# Patient Record
Sex: FEMALE | Race: ASIAN | NOT HISPANIC OR LATINO | Employment: FULL TIME
[De-identification: names, ages, dates, MRNs, and addresses within clinical notes are randomized per-mention and may not be internally consistent; named-entity substitution may affect disease eponyms.]

---

## 2017-01-16 ENCOUNTER — RECORDS - HEALTHEAST (OUTPATIENT)
Dept: ADMINISTRATIVE | Facility: OTHER | Age: 52
End: 2017-01-16

## 2017-01-27 ENCOUNTER — RECORDS - HEALTHEAST (OUTPATIENT)
Dept: ADMINISTRATIVE | Facility: OTHER | Age: 52
End: 2017-01-27

## 2017-03-13 ENCOUNTER — OFFICE VISIT - HEALTHEAST (OUTPATIENT)
Dept: INTERNAL MEDICINE | Facility: CLINIC | Age: 52
End: 2017-03-13

## 2017-03-13 DIAGNOSIS — M19.90 OSTEOARTHRITIS: ICD-10-CM

## 2017-03-13 DIAGNOSIS — M25.551 RIGHT HIP PAIN: ICD-10-CM

## 2017-03-13 ASSESSMENT — MIFFLIN-ST. JEOR: SCORE: 1075.35

## 2017-06-12 ENCOUNTER — RECORDS - HEALTHEAST (OUTPATIENT)
Dept: ADMINISTRATIVE | Facility: OTHER | Age: 52
End: 2017-06-12

## 2017-06-12 ENCOUNTER — AMBULATORY - HEALTHEAST (OUTPATIENT)
Dept: MULTI SPECIALTY CLINIC | Facility: CLINIC | Age: 52
End: 2017-06-12

## 2017-06-12 LAB — PAP SMEAR - HIM PATIENT REPORTED: NORMAL

## 2017-06-13 ENCOUNTER — OFFICE VISIT - HEALTHEAST (OUTPATIENT)
Dept: INTERNAL MEDICINE | Facility: CLINIC | Age: 52
End: 2017-06-13

## 2017-06-13 DIAGNOSIS — I10 ESSENTIAL HYPERTENSION: ICD-10-CM

## 2017-06-13 DIAGNOSIS — L50.9 URTICARIA: ICD-10-CM

## 2017-07-11 ENCOUNTER — OFFICE VISIT - HEALTHEAST (OUTPATIENT)
Dept: INTERNAL MEDICINE | Facility: CLINIC | Age: 52
End: 2017-07-11

## 2017-07-11 DIAGNOSIS — M54.9 BACK PAIN: ICD-10-CM

## 2017-07-11 DIAGNOSIS — I10 ESSENTIAL HYPERTENSION: ICD-10-CM

## 2017-10-14 ENCOUNTER — RECORDS - HEALTHEAST (OUTPATIENT)
Dept: ADMINISTRATIVE | Facility: OTHER | Age: 52
End: 2017-10-14

## 2017-10-16 ENCOUNTER — OFFICE VISIT - HEALTHEAST (OUTPATIENT)
Dept: INTERNAL MEDICINE | Facility: CLINIC | Age: 52
End: 2017-10-16

## 2017-10-16 DIAGNOSIS — J30.2 SEASONAL ALLERGIES: ICD-10-CM

## 2017-10-16 DIAGNOSIS — G47.00 INSOMNIA: ICD-10-CM

## 2017-10-16 DIAGNOSIS — Z23 NEED FOR IMMUNIZATION AGAINST INFLUENZA: ICD-10-CM

## 2017-10-16 DIAGNOSIS — I10 ESSENTIAL HYPERTENSION: ICD-10-CM

## 2017-10-16 LAB
CHOLEST SERPL-MCNC: 205 MG/DL
FASTING STATUS PATIENT QL REPORTED: YES
HDLC SERPL-MCNC: 57 MG/DL
LDLC SERPL CALC-MCNC: 128 MG/DL
TRIGL SERPL-MCNC: 102 MG/DL

## 2017-10-16 ASSESSMENT — MIFFLIN-ST. JEOR: SCORE: 1079.89

## 2017-10-17 ENCOUNTER — COMMUNICATION - HEALTHEAST (OUTPATIENT)
Dept: INTERNAL MEDICINE | Facility: CLINIC | Age: 52
End: 2017-10-17

## 2017-12-18 ENCOUNTER — OFFICE VISIT - HEALTHEAST (OUTPATIENT)
Dept: INTERNAL MEDICINE | Facility: CLINIC | Age: 52
End: 2017-12-18

## 2017-12-18 DIAGNOSIS — M54.9 BACK PAIN: ICD-10-CM

## 2017-12-18 DIAGNOSIS — M25.551 RIGHT HIP PAIN: ICD-10-CM

## 2017-12-18 DIAGNOSIS — I10 ESSENTIAL HYPERTENSION: ICD-10-CM

## 2017-12-18 DIAGNOSIS — R35.0 FREQUENCY OF URINATION: ICD-10-CM

## 2017-12-18 LAB
CHOLEST SERPL-MCNC: 191 MG/DL
FASTING STATUS PATIENT QL REPORTED: YES
HDLC SERPL-MCNC: 57 MG/DL
LDLC SERPL CALC-MCNC: 113 MG/DL
TRIGL SERPL-MCNC: 105 MG/DL

## 2017-12-18 ASSESSMENT — MIFFLIN-ST. JEOR: SCORE: 1079.89

## 2017-12-19 ENCOUNTER — COMMUNICATION - HEALTHEAST (OUTPATIENT)
Dept: INTERNAL MEDICINE | Facility: CLINIC | Age: 52
End: 2017-12-19

## 2017-12-19 DIAGNOSIS — I10 ESSENTIAL HYPERTENSION: ICD-10-CM

## 2017-12-20 ENCOUNTER — COMMUNICATION - HEALTHEAST (OUTPATIENT)
Dept: INTERNAL MEDICINE | Facility: CLINIC | Age: 52
End: 2017-12-20

## 2018-02-19 ENCOUNTER — RECORDS - HEALTHEAST (OUTPATIENT)
Dept: ADMINISTRATIVE | Facility: OTHER | Age: 53
End: 2018-02-19

## 2018-03-19 ENCOUNTER — OFFICE VISIT - HEALTHEAST (OUTPATIENT)
Dept: INTERNAL MEDICINE | Facility: CLINIC | Age: 53
End: 2018-03-19

## 2018-03-19 DIAGNOSIS — I10 ESSENTIAL HYPERTENSION: ICD-10-CM

## 2018-03-19 DIAGNOSIS — N94.6 MENSTRUAL PAIN: ICD-10-CM

## 2018-03-19 DIAGNOSIS — Z12.11 SCREEN FOR COLON CANCER: ICD-10-CM

## 2018-03-19 LAB
ALBUMIN SERPL-MCNC: 3.7 G/DL (ref 3.5–5)
ALP SERPL-CCNC: 79 U/L (ref 45–120)
ALT SERPL W P-5'-P-CCNC: 23 U/L (ref 0–45)
ANION GAP SERPL CALCULATED.3IONS-SCNC: 8 MMOL/L (ref 5–18)
AST SERPL W P-5'-P-CCNC: 21 U/L (ref 0–40)
BILIRUB SERPL-MCNC: 0.6 MG/DL (ref 0–1)
BUN SERPL-MCNC: 12 MG/DL (ref 8–22)
CALCIUM SERPL-MCNC: 9.3 MG/DL (ref 8.5–10.5)
CHLORIDE BLD-SCNC: 106 MMOL/L (ref 98–107)
CHOLEST SERPL-MCNC: 209 MG/DL
CO2 SERPL-SCNC: 25 MMOL/L (ref 22–31)
CREAT SERPL-MCNC: 0.67 MG/DL (ref 0.6–1.1)
ERYTHROCYTE [DISTWIDTH] IN BLOOD BY AUTOMATED COUNT: 12.5 % (ref 11–14.5)
FASTING STATUS PATIENT QL REPORTED: YES
GFR SERPL CREATININE-BSD FRML MDRD: >60 ML/MIN/1.73M2
GLUCOSE BLD-MCNC: 104 MG/DL (ref 70–125)
HCT VFR BLD AUTO: 36.5 % (ref 35–47)
HDLC SERPL-MCNC: 60 MG/DL
HGB BLD-MCNC: 12.3 G/DL (ref 12–16)
LDLC SERPL CALC-MCNC: 129 MG/DL
MCH RBC QN AUTO: 33.8 PG (ref 27–34)
MCHC RBC AUTO-ENTMCNC: 33.7 G/DL (ref 32–36)
MCV RBC AUTO: 100 FL (ref 80–100)
PLATELET # BLD AUTO: 320 THOU/UL (ref 140–440)
PMV BLD AUTO: 7.3 FL (ref 7–10)
POTASSIUM BLD-SCNC: 4.2 MMOL/L (ref 3.5–5)
PROT SERPL-MCNC: 8 G/DL (ref 6–8)
RBC # BLD AUTO: 3.64 MILL/UL (ref 3.8–5.4)
SODIUM SERPL-SCNC: 139 MMOL/L (ref 136–145)
TRIGL SERPL-MCNC: 100 MG/DL
WBC: 4.3 THOU/UL (ref 4–11)

## 2018-03-19 ASSESSMENT — MIFFLIN-ST. JEOR: SCORE: 1098.03

## 2018-03-20 ENCOUNTER — COMMUNICATION - HEALTHEAST (OUTPATIENT)
Dept: INTERNAL MEDICINE | Facility: CLINIC | Age: 53
End: 2018-03-20

## 2018-04-12 ENCOUNTER — COMMUNICATION - HEALTHEAST (OUTPATIENT)
Dept: INTERNAL MEDICINE | Facility: CLINIC | Age: 53
End: 2018-04-12

## 2018-08-09 ENCOUNTER — RECORDS - HEALTHEAST (OUTPATIENT)
Dept: ADMINISTRATIVE | Facility: OTHER | Age: 53
End: 2018-08-09

## 2018-08-09 ENCOUNTER — TRANSFERRED RECORDS (OUTPATIENT)
Dept: HEALTH INFORMATION MANAGEMENT | Facility: CLINIC | Age: 53
End: 2018-08-09

## 2018-08-13 ENCOUNTER — COMMUNICATION - HEALTHEAST (OUTPATIENT)
Dept: SCHEDULING | Facility: CLINIC | Age: 53
End: 2018-08-13

## 2018-08-24 ENCOUNTER — OFFICE VISIT - HEALTHEAST (OUTPATIENT)
Dept: INTERNAL MEDICINE | Facility: CLINIC | Age: 53
End: 2018-08-24

## 2018-08-24 DIAGNOSIS — I10 ESSENTIAL HYPERTENSION: ICD-10-CM

## 2018-08-24 ASSESSMENT — MIFFLIN-ST. JEOR: SCORE: 1093.5

## 2019-01-21 ENCOUNTER — OFFICE VISIT - HEALTHEAST (OUTPATIENT)
Dept: INTERNAL MEDICINE | Facility: CLINIC | Age: 54
End: 2019-01-21

## 2019-01-21 DIAGNOSIS — Z23 NEED FOR PROPHYLACTIC VACCINATION AND INOCULATION AGAINST INFLUENZA: ICD-10-CM

## 2019-01-21 DIAGNOSIS — K30 ACID PEPSIN DISEASE: ICD-10-CM

## 2019-01-21 DIAGNOSIS — E78.00 PURE HYPERCHOLESTEROLEMIA: ICD-10-CM

## 2019-01-21 DIAGNOSIS — I10 ESSENTIAL HYPERTENSION: ICD-10-CM

## 2019-01-21 LAB
ALBUMIN SERPL-MCNC: 4.2 G/DL (ref 3.5–5)
ALP SERPL-CCNC: 88 U/L (ref 45–120)
ALT SERPL W P-5'-P-CCNC: 30 U/L (ref 0–45)
ANION GAP SERPL CALCULATED.3IONS-SCNC: 12 MMOL/L (ref 5–18)
AST SERPL W P-5'-P-CCNC: 31 U/L (ref 0–40)
BILIRUB DIRECT SERPL-MCNC: 0.2 MG/DL
BILIRUB SERPL-MCNC: 0.7 MG/DL (ref 0–1)
BUN SERPL-MCNC: 10 MG/DL (ref 8–22)
CALCIUM SERPL-MCNC: 10 MG/DL (ref 8.5–10.5)
CHLORIDE BLD-SCNC: 106 MMOL/L (ref 98–107)
CHOLEST SERPL-MCNC: 240 MG/DL
CO2 SERPL-SCNC: 22 MMOL/L (ref 22–31)
CREAT SERPL-MCNC: 0.71 MG/DL (ref 0.6–1.1)
ERYTHROCYTE [DISTWIDTH] IN BLOOD BY AUTOMATED COUNT: 11.2 % (ref 11–14.5)
FASTING STATUS PATIENT QL REPORTED: YES
GFR SERPL CREATININE-BSD FRML MDRD: >60 ML/MIN/1.73M2
GLUCOSE BLD-MCNC: 100 MG/DL (ref 70–125)
HCT VFR BLD AUTO: 39.7 % (ref 35–47)
HDLC SERPL-MCNC: 70 MG/DL
HGB BLD-MCNC: 13.2 G/DL (ref 12–16)
LDLC SERPL CALC-MCNC: 148 MG/DL
MCH RBC QN AUTO: 33.9 PG (ref 27–34)
MCHC RBC AUTO-ENTMCNC: 33.3 G/DL (ref 32–36)
MCV RBC AUTO: 102 FL (ref 80–100)
PLATELET # BLD AUTO: 332 THOU/UL (ref 140–440)
PMV BLD AUTO: 7.6 FL (ref 7–10)
POTASSIUM BLD-SCNC: 4.1 MMOL/L (ref 3.5–5)
PROT SERPL-MCNC: 8.9 G/DL (ref 6–8)
RBC # BLD AUTO: 3.88 MILL/UL (ref 3.8–5.4)
SODIUM SERPL-SCNC: 140 MMOL/L (ref 136–145)
TRIGL SERPL-MCNC: 110 MG/DL
TSH SERPL DL<=0.005 MIU/L-ACNC: 1.2 UIU/ML (ref 0.3–5)
WBC: 4.5 THOU/UL (ref 4–11)

## 2019-01-21 ASSESSMENT — MIFFLIN-ST. JEOR: SCORE: 1075.9

## 2019-01-22 ENCOUNTER — COMMUNICATION - HEALTHEAST (OUTPATIENT)
Dept: INTERNAL MEDICINE | Facility: CLINIC | Age: 54
End: 2019-01-22

## 2019-03-15 ENCOUNTER — RECORDS - HEALTHEAST (OUTPATIENT)
Dept: ADMINISTRATIVE | Facility: OTHER | Age: 54
End: 2019-03-15

## 2019-03-17 ENCOUNTER — COMMUNICATION - HEALTHEAST (OUTPATIENT)
Dept: INTERNAL MEDICINE | Facility: CLINIC | Age: 54
End: 2019-03-17

## 2019-03-17 DIAGNOSIS — I10 ESSENTIAL HYPERTENSION: ICD-10-CM

## 2019-04-22 ENCOUNTER — RECORDS - HEALTHEAST (OUTPATIENT)
Dept: ADMINISTRATIVE | Facility: OTHER | Age: 54
End: 2019-04-22

## 2019-05-01 ENCOUNTER — COMMUNICATION - HEALTHEAST (OUTPATIENT)
Dept: INTERNAL MEDICINE | Facility: CLINIC | Age: 54
End: 2019-05-01

## 2019-05-02 ENCOUNTER — OFFICE VISIT - HEALTHEAST (OUTPATIENT)
Dept: INTERNAL MEDICINE | Facility: CLINIC | Age: 54
End: 2019-05-02

## 2019-05-02 ENCOUNTER — COMMUNICATION - HEALTHEAST (OUTPATIENT)
Dept: INTERNAL MEDICINE | Facility: CLINIC | Age: 54
End: 2019-05-02

## 2019-05-02 DIAGNOSIS — I10 ESSENTIAL HYPERTENSION: ICD-10-CM

## 2019-05-02 DIAGNOSIS — M25.511 ACUTE PAIN OF RIGHT SHOULDER: ICD-10-CM
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
## 2019-05-02 ASSESSMENT — MIFFLIN-ST. JEOR: SCORE: 1079.89

## 2019-05-16 ENCOUNTER — COMMUNICATION - HEALTHEAST (OUTPATIENT)
Dept: INTERNAL MEDICINE | Facility: CLINIC | Age: 54
End: 2019-05-16

## 2019-05-24 ENCOUNTER — COMMUNICATION - HEALTHEAST (OUTPATIENT)
Dept: INTERNAL MEDICINE | Facility: CLINIC | Age: 54
End: 2019-05-24

## 2019-05-24 DIAGNOSIS — M25.511 ACUTE PAIN OF RIGHT SHOULDER: ICD-10-CM

## 2019-06-03 ENCOUNTER — OFFICE VISIT - HEALTHEAST (OUTPATIENT)
Dept: INTERNAL MEDICINE | Facility: CLINIC | Age: 54
End: 2019-06-03

## 2019-06-03 DIAGNOSIS — Z00.00 ROUTINE GENERAL MEDICAL EXAMINATION AT A HEALTH CARE FACILITY: ICD-10-CM

## 2019-06-03 DIAGNOSIS — E78.00 PURE HYPERCHOLESTEROLEMIA: ICD-10-CM

## 2019-06-03 DIAGNOSIS — I10 ESSENTIAL HYPERTENSION: ICD-10-CM

## 2019-06-03 DIAGNOSIS — G89.29 CHRONIC RIGHT SHOULDER PAIN: ICD-10-CM

## 2019-06-03 DIAGNOSIS — M25.511 CHRONIC RIGHT SHOULDER PAIN: ICD-10-CM

## 2019-06-03 LAB
ALBUMIN SERPL-MCNC: 4.1 G/DL (ref 3.5–5)
ALBUMIN UR-MCNC: NEGATIVE MG/DL
ALP SERPL-CCNC: 91 U/L (ref 45–120)
ALT SERPL W P-5'-P-CCNC: 30 U/L (ref 0–45)
ANION GAP SERPL CALCULATED.3IONS-SCNC: 11 MMOL/L (ref 5–18)
APPEARANCE UR: CLEAR
AST SERPL W P-5'-P-CCNC: 26 U/L (ref 0–40)
BACTERIA #/AREA URNS HPF: ABNORMAL HPF
BILIRUB DIRECT SERPL-MCNC: 0.2 MG/DL
BILIRUB SERPL-MCNC: 0.5 MG/DL (ref 0–1)
BILIRUB UR QL STRIP: NEGATIVE
BUN SERPL-MCNC: 11 MG/DL (ref 8–22)
CALCIUM SERPL-MCNC: 10.3 MG/DL (ref 8.5–10.5)
CHLORIDE BLD-SCNC: 106 MMOL/L (ref 98–107)
CHOLEST SERPL-MCNC: 241 MG/DL
CO2 SERPL-SCNC: 25 MMOL/L (ref 22–31)
COLOR UR AUTO: YELLOW
CREAT SERPL-MCNC: 0.7 MG/DL (ref 0.6–1.1)
ERYTHROCYTE [DISTWIDTH] IN BLOOD BY AUTOMATED COUNT: 11 % (ref 11–14.5)
FASTING STATUS PATIENT QL REPORTED: YES
GFR SERPL CREATININE-BSD FRML MDRD: >60 ML/MIN/1.73M2
GLUCOSE BLD-MCNC: 101 MG/DL (ref 70–125)
GLUCOSE UR STRIP-MCNC: NEGATIVE MG/DL
HCT VFR BLD AUTO: 40.5 % (ref 35–47)
HDLC SERPL-MCNC: 66 MG/DL
HGB BLD-MCNC: 13.7 G/DL (ref 12–16)
HGB UR QL STRIP: ABNORMAL
KETONES UR STRIP-MCNC: NEGATIVE MG/DL
LDLC SERPL CALC-MCNC: 154 MG/DL
LEUKOCYTE ESTERASE UR QL STRIP: NEGATIVE
MCH RBC QN AUTO: 34.1 PG (ref 27–34)
MCHC RBC AUTO-ENTMCNC: 33.9 G/DL (ref 32–36)
MCV RBC AUTO: 101 FL (ref 80–100)
NITRATE UR QL: NEGATIVE
PH UR STRIP: 6 [PH] (ref 5–8)
PLATELET # BLD AUTO: 342 THOU/UL (ref 140–440)
PMV BLD AUTO: 7.7 FL (ref 7–10)
POTASSIUM BLD-SCNC: 4 MMOL/L (ref 3.5–5)
PROT SERPL-MCNC: 8.7 G/DL (ref 6–8)
RBC # BLD AUTO: 4.03 MILL/UL (ref 3.8–5.4)
RBC #/AREA URNS AUTO: ABNORMAL HPF
SODIUM SERPL-SCNC: 142 MMOL/L (ref 136–145)
SP GR UR STRIP: 1.01 (ref 1–1.03)
SQUAMOUS #/AREA URNS AUTO: ABNORMAL LPF
TRIGL SERPL-MCNC: 106 MG/DL
UROBILINOGEN UR STRIP-ACNC: ABNORMAL
WBC #/AREA URNS AUTO: ABNORMAL HPF
WBC: 4 THOU/UL (ref 4–11)

## 2019-06-03 ASSESSMENT — MIFFLIN-ST. JEOR: SCORE: 1079.89

## 2019-06-04 ENCOUNTER — COMMUNICATION - HEALTHEAST (OUTPATIENT)
Dept: INTERNAL MEDICINE | Facility: CLINIC | Age: 54
End: 2019-06-04

## 2020-02-26 ENCOUNTER — COMMUNICATION - HEALTHEAST (OUTPATIENT)
Dept: INTERNAL MEDICINE | Facility: CLINIC | Age: 55
End: 2020-02-26

## 2020-02-26 DIAGNOSIS — I10 ESSENTIAL HYPERTENSION: ICD-10-CM

## 2020-11-19 ENCOUNTER — COMMUNICATION - HEALTHEAST (OUTPATIENT)
Dept: INTERNAL MEDICINE | Facility: CLINIC | Age: 55
End: 2020-11-19

## 2020-11-19 ENCOUNTER — OFFICE VISIT - HEALTHEAST (OUTPATIENT)
Dept: INTERNAL MEDICINE | Facility: CLINIC | Age: 55
End: 2020-11-19

## 2020-11-19 DIAGNOSIS — E78.00 PURE HYPERCHOLESTEROLEMIA: ICD-10-CM

## 2020-11-19 DIAGNOSIS — I10 ESSENTIAL HYPERTENSION: ICD-10-CM

## 2020-11-19 DIAGNOSIS — Z78.0 MENOPAUSE: ICD-10-CM

## 2020-11-19 DIAGNOSIS — Z00.00 ROUTINE GENERAL MEDICAL EXAMINATION AT A HEALTH CARE FACILITY: ICD-10-CM

## 2020-11-19 DIAGNOSIS — Z12.31 VISIT FOR SCREENING MAMMOGRAM: ICD-10-CM

## 2020-11-19 DIAGNOSIS — Z12.4 SCREENING FOR MALIGNANT NEOPLASM OF CERVIX: ICD-10-CM

## 2020-11-19 DIAGNOSIS — M16.11 PRIMARY OSTEOARTHRITIS OF RIGHT HIP: ICD-10-CM

## 2020-11-19 LAB
ALBUMIN SERPL-MCNC: 4.4 G/DL (ref 3.5–5)
ALBUMIN UR-MCNC: NEGATIVE MG/DL
ALP SERPL-CCNC: 100 U/L (ref 45–120)
ALT SERPL W P-5'-P-CCNC: 27 U/L (ref 0–45)
ANION GAP SERPL CALCULATED.3IONS-SCNC: 8 MMOL/L (ref 5–18)
APPEARANCE UR: CLEAR
AST SERPL W P-5'-P-CCNC: 27 U/L (ref 0–40)
BACTERIA #/AREA URNS HPF: ABNORMAL HPF
BILIRUB DIRECT SERPL-MCNC: 0.2 MG/DL
BILIRUB SERPL-MCNC: 0.6 MG/DL (ref 0–1)
BILIRUB UR QL STRIP: NEGATIVE
BUN SERPL-MCNC: 11 MG/DL (ref 8–22)
CALCIUM SERPL-MCNC: 9.7 MG/DL (ref 8.5–10.5)
CHLORIDE BLD-SCNC: 104 MMOL/L (ref 98–107)
CHOLEST SERPL-MCNC: 243 MG/DL
CO2 SERPL-SCNC: 26 MMOL/L (ref 22–31)
COLOR UR AUTO: YELLOW
CREAT SERPL-MCNC: 0.7 MG/DL (ref 0.6–1.1)
ERYTHROCYTE [DISTWIDTH] IN BLOOD BY AUTOMATED COUNT: 11.9 % (ref 11–14.5)
FASTING STATUS PATIENT QL REPORTED: YES
GFR SERPL CREATININE-BSD FRML MDRD: >60 ML/MIN/1.73M2
GLUCOSE BLD-MCNC: 101 MG/DL (ref 70–125)
GLUCOSE UR STRIP-MCNC: NEGATIVE MG/DL
HCT VFR BLD AUTO: 38.6 % (ref 35–47)
HDLC SERPL-MCNC: 63 MG/DL
HGB BLD-MCNC: 13 G/DL (ref 12–16)
HGB UR QL STRIP: ABNORMAL
KETONES UR STRIP-MCNC: NEGATIVE MG/DL
LDLC SERPL CALC-MCNC: 151 MG/DL
LEUKOCYTE ESTERASE UR QL STRIP: NEGATIVE
MCH RBC QN AUTO: 34.7 PG (ref 27–34)
MCHC RBC AUTO-ENTMCNC: 33.7 G/DL (ref 32–36)
MCV RBC AUTO: 103 FL (ref 80–100)
NITRATE UR QL: NEGATIVE
PH UR STRIP: 7 [PH] (ref 5–8)
PLATELET # BLD AUTO: 313 THOU/UL (ref 140–440)
PMV BLD AUTO: 10.6 FL (ref 8.5–12.5)
POTASSIUM BLD-SCNC: 4.1 MMOL/L (ref 3.5–5)
PROT SERPL-MCNC: 8.1 G/DL (ref 6–8)
RBC # BLD AUTO: 3.75 MILL/UL (ref 3.8–5.4)
RBC #/AREA URNS AUTO: ABNORMAL HPF
SODIUM SERPL-SCNC: 138 MMOL/L (ref 136–145)
SP GR UR STRIP: 1.01 (ref 1–1.03)
SQUAMOUS #/AREA URNS AUTO: ABNORMAL LPF
TRIGL SERPL-MCNC: 145 MG/DL
TSH SERPL DL<=0.005 MIU/L-ACNC: 1.45 UIU/ML (ref 0.3–5)
UROBILINOGEN UR STRIP-ACNC: ABNORMAL
WBC #/AREA URNS AUTO: ABNORMAL HPF
WBC: 4.4 THOU/UL (ref 4–11)

## 2020-11-19 RX ORDER — CELECOXIB 200 MG/1
200 CAPSULE ORAL DAILY
Qty: 30 CAPSULE | Refills: 2 | Status: SHIPPED | OUTPATIENT
Start: 2020-11-19 | End: 2022-11-18

## 2020-11-19 ASSESSMENT — MIFFLIN-ST. JEOR: SCORE: 1102

## 2020-11-20 LAB — 25(OH)D3 SERPL-MCNC: 21.1 NG/ML (ref 30–80)

## 2020-11-24 ENCOUNTER — COMMUNICATION - HEALTHEAST (OUTPATIENT)
Dept: INTERNAL MEDICINE | Facility: CLINIC | Age: 55
End: 2020-11-24

## 2020-12-01 ENCOUNTER — OFFICE VISIT - HEALTHEAST (OUTPATIENT)
Dept: INTERNAL MEDICINE | Facility: CLINIC | Age: 55
End: 2020-12-01

## 2020-12-01 DIAGNOSIS — I10 ESSENTIAL HYPERTENSION: ICD-10-CM

## 2020-12-01 DIAGNOSIS — E78.00 PURE HYPERCHOLESTEROLEMIA: ICD-10-CM

## 2020-12-01 RX ORDER — SIMVASTATIN 10 MG
10 TABLET ORAL AT BEDTIME
Qty: 90 TABLET | Refills: 3 | Status: SHIPPED | OUTPATIENT
Start: 2020-12-01 | End: 2021-12-10

## 2020-12-16 ENCOUNTER — COMMUNICATION - HEALTHEAST (OUTPATIENT)
Dept: INTERNAL MEDICINE | Facility: CLINIC | Age: 55
End: 2020-12-16

## 2020-12-16 DIAGNOSIS — I10 ESSENTIAL HYPERTENSION: ICD-10-CM

## 2020-12-17 ENCOUNTER — OFFICE VISIT - HEALTHEAST (OUTPATIENT)
Dept: INTERNAL MEDICINE | Facility: CLINIC | Age: 55
End: 2020-12-17

## 2020-12-17 DIAGNOSIS — E78.00 PURE HYPERCHOLESTEROLEMIA: ICD-10-CM

## 2020-12-17 DIAGNOSIS — I10 ESSENTIAL HYPERTENSION: ICD-10-CM

## 2020-12-17 LAB
ALBUMIN SERPL-MCNC: 4.3 G/DL (ref 3.5–5)
ALP SERPL-CCNC: 96 U/L (ref 45–120)
ALT SERPL W P-5'-P-CCNC: 25 U/L (ref 0–45)
ANION GAP SERPL CALCULATED.3IONS-SCNC: 10 MMOL/L (ref 5–18)
AST SERPL W P-5'-P-CCNC: 28 U/L (ref 0–40)
BILIRUB DIRECT SERPL-MCNC: 0.2 MG/DL
BILIRUB SERPL-MCNC: 0.6 MG/DL (ref 0–1)
BUN SERPL-MCNC: 11 MG/DL (ref 8–22)
CALCIUM SERPL-MCNC: 9.4 MG/DL (ref 8.5–10.5)
CHLORIDE BLD-SCNC: 106 MMOL/L (ref 98–107)
CHOLEST SERPL-MCNC: 192 MG/DL
CO2 SERPL-SCNC: 25 MMOL/L (ref 22–31)
CREAT SERPL-MCNC: 0.69 MG/DL (ref 0.6–1.1)
ERYTHROCYTE [DISTWIDTH] IN BLOOD BY AUTOMATED COUNT: 10.3 % (ref 11–14.5)
FASTING STATUS PATIENT QL REPORTED: YES
GFR SERPL CREATININE-BSD FRML MDRD: >60 ML/MIN/1.73M2
GLUCOSE BLD-MCNC: 97 MG/DL (ref 70–125)
HCT VFR BLD AUTO: 39.2 % (ref 35–47)
HDLC SERPL-MCNC: 64 MG/DL
HGB BLD-MCNC: 13.3 G/DL (ref 12–16)
LDLC SERPL CALC-MCNC: 104 MG/DL
MCH RBC QN AUTO: 34.8 PG (ref 27–34)
MCHC RBC AUTO-ENTMCNC: 34 G/DL (ref 32–36)
MCV RBC AUTO: 102 FL (ref 80–100)
PLATELET # BLD AUTO: 317 THOU/UL (ref 140–440)
PMV BLD AUTO: 7.4 FL (ref 7–10)
POTASSIUM BLD-SCNC: 4.1 MMOL/L (ref 3.5–5)
PROT SERPL-MCNC: 8 G/DL (ref 6–8)
RBC # BLD AUTO: 3.83 MILL/UL (ref 3.8–5.4)
SODIUM SERPL-SCNC: 141 MMOL/L (ref 136–145)
TRIGL SERPL-MCNC: 122 MG/DL
WBC: 4.5 THOU/UL (ref 4–11)

## 2020-12-17 ASSESSMENT — MIFFLIN-ST. JEOR: SCORE: 1102

## 2020-12-21 ENCOUNTER — COMMUNICATION - HEALTHEAST (OUTPATIENT)
Dept: INTERNAL MEDICINE | Facility: CLINIC | Age: 55
End: 2020-12-21

## 2021-01-28 ENCOUNTER — OFFICE VISIT - HEALTHEAST (OUTPATIENT)
Dept: INTERNAL MEDICINE | Facility: CLINIC | Age: 56
End: 2021-01-28

## 2021-01-28 DIAGNOSIS — I10 ESSENTIAL HYPERTENSION: ICD-10-CM

## 2021-01-28 DIAGNOSIS — E78.2 MIXED HYPERLIPIDEMIA: ICD-10-CM

## 2021-01-28 LAB
ALBUMIN SERPL-MCNC: 4.4 G/DL (ref 3.5–5)
ALP SERPL-CCNC: 94 U/L (ref 45–120)
ALT SERPL W P-5'-P-CCNC: 29 U/L (ref 0–45)
ANION GAP SERPL CALCULATED.3IONS-SCNC: 9 MMOL/L (ref 5–18)
AST SERPL W P-5'-P-CCNC: 23 U/L (ref 0–40)
BILIRUB DIRECT SERPL-MCNC: 0.3 MG/DL
BILIRUB SERPL-MCNC: 0.9 MG/DL (ref 0–1)
BUN SERPL-MCNC: 9 MG/DL (ref 8–22)
CALCIUM SERPL-MCNC: 9.6 MG/DL (ref 8.5–10.5)
CHLORIDE BLD-SCNC: 108 MMOL/L (ref 98–107)
CHOLEST SERPL-MCNC: 154 MG/DL
CO2 SERPL-SCNC: 23 MMOL/L (ref 22–31)
CREAT SERPL-MCNC: 0.7 MG/DL (ref 0.6–1.1)
ERYTHROCYTE [DISTWIDTH] IN BLOOD BY AUTOMATED COUNT: 11.3 % (ref 11–14.5)
FASTING STATUS PATIENT QL REPORTED: YES
GFR SERPL CREATININE-BSD FRML MDRD: >60 ML/MIN/1.73M2
GLUCOSE BLD-MCNC: 98 MG/DL (ref 70–125)
HCT VFR BLD AUTO: 38.6 % (ref 35–47)
HDLC SERPL-MCNC: 54 MG/DL
HGB BLD-MCNC: 13.3 G/DL (ref 12–16)
LDLC SERPL CALC-MCNC: 79 MG/DL
MCH RBC QN AUTO: 34.2 PG (ref 27–34)
MCHC RBC AUTO-ENTMCNC: 34.5 G/DL (ref 32–36)
MCV RBC AUTO: 99 FL (ref 80–100)
PLATELET # BLD AUTO: 360 THOU/UL (ref 140–440)
PMV BLD AUTO: 9.3 FL (ref 7–10)
POTASSIUM BLD-SCNC: 4.5 MMOL/L (ref 3.5–5)
PROT SERPL-MCNC: 8.2 G/DL (ref 6–8)
RBC # BLD AUTO: 3.89 MILL/UL (ref 3.8–5.4)
SODIUM SERPL-SCNC: 140 MMOL/L (ref 136–145)
TRIGL SERPL-MCNC: 106 MG/DL
WBC: 4.4 THOU/UL (ref 4–11)

## 2021-01-28 ASSESSMENT — MIFFLIN-ST. JEOR: SCORE: 1088.39

## 2021-02-02 ENCOUNTER — COMMUNICATION - HEALTHEAST (OUTPATIENT)
Dept: INTERNAL MEDICINE | Facility: CLINIC | Age: 56
End: 2021-02-02

## 2021-02-03 ENCOUNTER — COMMUNICATION - HEALTHEAST (OUTPATIENT)
Dept: INTERNAL MEDICINE | Facility: CLINIC | Age: 56
End: 2021-02-03

## 2021-02-03 DIAGNOSIS — I10 ESSENTIAL HYPERTENSION: ICD-10-CM

## 2021-03-11 ENCOUNTER — COMMUNICATION - HEALTHEAST (OUTPATIENT)
Dept: INTERNAL MEDICINE | Facility: CLINIC | Age: 56
End: 2021-03-11

## 2021-03-11 ENCOUNTER — OFFICE VISIT - HEALTHEAST (OUTPATIENT)
Dept: INTERNAL MEDICINE | Facility: CLINIC | Age: 56
End: 2021-03-11

## 2021-03-11 DIAGNOSIS — I10 ESSENTIAL HYPERTENSION: ICD-10-CM

## 2021-03-11 DIAGNOSIS — E78.2 MIXED HYPERLIPIDEMIA: ICD-10-CM

## 2021-03-11 DIAGNOSIS — E55.9 VITAMIN D DEFICIENCY: ICD-10-CM

## 2021-03-11 RX ORDER — AMLODIPINE BESYLATE 10 MG/1
10 TABLET ORAL DAILY
Qty: 90 TABLET | Refills: 3 | Status: SHIPPED | OUTPATIENT
Start: 2021-03-11 | End: 2022-11-18

## 2021-03-11 RX ORDER — AMLODIPINE BESYLATE 5 MG/1
5 TABLET ORAL 2 TIMES DAILY
Qty: 180 TABLET | Refills: 3 | Status: SHIPPED | OUTPATIENT
Start: 2021-03-11 | End: 2022-11-18

## 2021-03-30 ENCOUNTER — COMMUNICATION - HEALTHEAST (OUTPATIENT)
Dept: INTERNAL MEDICINE | Facility: CLINIC | Age: 56
End: 2021-03-30

## 2021-03-30 DIAGNOSIS — I10 ESSENTIAL HYPERTENSION: ICD-10-CM

## 2021-03-30 RX ORDER — LISINOPRIL 40 MG/1
20 TABLET ORAL 2 TIMES DAILY
Qty: 90 TABLET | Refills: 3 | Status: SHIPPED | OUTPATIENT
Start: 2021-03-30 | End: 2022-11-18

## 2021-04-24 ENCOUNTER — HEALTH MAINTENANCE LETTER (OUTPATIENT)
Age: 56
End: 2021-04-24

## 2021-05-28 NOTE — TELEPHONE ENCOUNTER
Left a detailed message with son asking where to fax or send completed FMLA forms. Forms are on my desk.    Naomie Au LPN

## 2021-05-28 NOTE — PROGRESS NOTES
Office Visit - Follow Up   Bria Moore   53 y.o. female    Date of Visit: 5/2/2019    Chief Complaint   Patient presents with     Paperwork     FMLA paperwork for son to care for pt., here with daughter         Assessment and Plan   1. Acute pain of right shoulder  His right shoulder pains from the right shoulder joint to the right shoulder blade.  Started on 4/18/2019 after lifting 50 to 100 pounds garbage trash to throw into the container which was quite high for her show she has to hyperextend her right shoulder to reach the container.  After doing this started to have right shoulder pains for which it became  intense and unbearable.  Went to the urgency room on 4/22/2019 because of intense pains.  Was diagnosed with thoracic myofascial pains due to upper extension of her right shoulder.  Had negative x-ray.  Was sent for physical therapy which is now undergoing.  Gets physical therapy 2 times a week.  Pains are still intense.  So she is not able to report for work.  Takes ibuprofen as needed.  Will change this to Celebrex 200 mg daily and will follow her in 1 week.  - celecoxib (CELEBREX) 200 MG capsule; Take 1 capsule (200 mg total) by mouth daily.  Dispense: 30 capsule; Refill: 2    2. Work related injury  Her right shoulder pains are due to work-related injury.  Advised to have this classified as a work comp.    3. Essential hypertension  Not ideally controlled today but continues to take her lisinopril.  Due to her stress and pains.  Continue lisinopril 20 mg daily.    Reviewed urgency room MD notes and recommendations and discussed these with the patient. Reviewed her xray and discussed result with the patient.     FMLA form filled up for her son.     Follow up in 4 weeks.     History of Present Illness   This 53 y.o. old female is here for  follow-up of her work injury related right shoulder pains.  Had a sudden onset of right shoulder pains which started in the right shoulder joint to up to the right  shoulder blade on 4/18/2019 after carrying 50 to 100  pounds garbage trash and trying to control this on garbage container which was placed on a higher ground.  In doing so she hyperextended her right shoulder and right upper extremity.  After doing it started to have right shoulder pains which radiated to her right shoulder blade and becoming intense and unbearable.  Went to the  urgency room 3 days later.  Had x-ray which was negative.  Was assessed to have myofascial pains and  advised to take ibuprofen and Tylenol.  Was also referred for physical therapy which is now undergoing 2 times a week.  Has hypertension.  Not ideally controlled today by lisinopril because of her stress and pains.  Overall, stable.    Review of Systems   A 12 point comprehensive review of systems was negative except as noted..     Medications, Allergies and Problem List   Reviewed and updated             Chief Complaint   Paperwork ([place] paperwork for son to care for pt., here with daughter )       Patient Profile   Social History     Social History Narrative     Not on file        Past Medical History   Patient Active Problem List   Diagnosis     Back pain     Inguinal pain, right     Essential hypertension     Pure hypercholesterolemia       Past Surgical History  She has no past surgical history on file.       Medications and Allergies   Current Outpatient Medications   Medication Sig     lisinopril (PRINIVIL,ZESTRIL) 20 MG tablet Take 1 tablet (20 mg total) by mouth daily.     celecoxib (CELEBREX) 200 MG capsule Take 1 capsule (200 mg total) by mouth daily.     No Known Allergies     Family and Social History   No family history on file.     Social History     Tobacco Use     Smoking status: Never Smoker     Smokeless tobacco: Never Used   Substance Use Topics     Alcohol use: Not on file     Drug use: Not on file        Physical Exam       Physical Exam  BP (!) 142/98   Pulse 81   Ht 5' (1.524 m)   Wt 122 lb (55.3 kg)   SpO2 99%    BMI 23.83 kg/m    General appearance: alert, appears stated age, cooperative, mild distress and in pain  Head: Normocephalic, without obvious abnormality, atraumatic  Throat: lips, mucosa, and tongue normal; teeth and gums normal  Neck: no adenopathy, no carotid bruit, no JVD, supple, symmetrical, trachea midline and thyroid not enlarged, symmetric, no tenderness/mass/nodules  Lungs: clear to auscultation bilaterally  Heart: regular rate and rhythm, S1, S2 normal, no murmur, click, rub or gallop  Abdomen: soft, non-tender; bowel sounds normal; no masses,  no organomegaly  Extremities: extremities normal, atraumatic, no cyanosis or edema  Skin: Skin color, texture, turgor normal. No rashes or lesions  Neurologic: Grossly normal  Musculoskeletal: Tender right shoulder joint and right shoulder blade with limited range of motion especially abduction of the right shoulder, right upper extremity strength is normal     Additional Information        Jakub Andrea MD  Internal Medicine  Contact me at [phone]     Additional Information   Current Outpatient Medications   Medication Sig     lisinopril (PRINIVIL,ZESTRIL) 20 MG tablet Take 1 tablet (20 mg total) by mouth daily.     celecoxib (CELEBREX) 200 MG capsule Take 1 capsule (200 mg total) by mouth daily.     No Known Allergies  Social History     Tobacco Use     Smoking status: Never Smoker     Smokeless tobacco: Never Used   Substance Use Topics     Alcohol use: Not on file     Drug use: Not on file         Time: total time spent with the patient was 40 minutes of which >50% was spent in counseling and coordination of care

## 2021-05-28 NOTE — TELEPHONE ENCOUNTER
Patient Returning Call  Reason for call:  Patient's son Ranulfo called back to give new fax number: [phone]. In regard to us becca PRIETO paperwork  Attention: Linette Kilpatrick @ [facility] the [employer]  Information relayed to patient:  N/A  Patient has additional questions:  No  If YES, what are your questions/concerns:  N/A  Okay to leave a detailed message?: Yes

## 2021-05-28 NOTE — TELEPHONE ENCOUNTER
Patient needs appt per MD in order to get these forms filled out.     Message left with akira Winchester.    Naomie Au LPN

## 2021-05-28 NOTE — TELEPHONE ENCOUNTER
Dr. Andrea,  Paperwork has been placed in your in box to review.  Mirtha THAKKAR, CMA/CMT....................9:08 AM

## 2021-05-28 NOTE — TELEPHONE ENCOUNTER
Left a detailed message with patient and son asking to call back and confirm fax number as the previous one was invalid.     Naomie Au LPN

## 2021-05-28 NOTE — TELEPHONE ENCOUNTER
Patient Returning Call  Reason for call:  Son Jim calling back to give fax information where to send the FMLA forms per phone message left for him from Naomie at the Clinic. FAX: [phone] HR @ [facility] Attention: Linette Kilpatirck  Information relayed to patient:  N/A, son returning your call   Patient has additional questions: No  If YES, what are your questions/concerns:  N/A  Okay to leave a detailed message?: Please call to leave message form was faxed and confirmed it went through. Thank you!

## 2021-05-28 NOTE — TELEPHONE ENCOUNTER
Who is calling:  Son  Reason for Call:  States he faxed a form for his mother yesterday wants to make sure clinic got it and also when it can be faxed to HR?

## 2021-05-29 NOTE — PROGRESS NOTES
Office Visit - Physical Exam   Bria Moore   53 y.o. female    Date of Visit: 6/3/2019    Chief Complaint   Patient presents with     Annual Exam     fasting, up to date on Mammo, colonoscopy and pap, seeing chiro for R shoulder pain, wants to do physical therapy after PT         Assessment and Plan   1. Routine general medical examination at a health care facility  Advised her comprehensive physical exam is normal.    2. Chronic right shoulder pain  Has chronic right shoulder pains which is work-related.  Getting chiropractic treatment which is helping.  Takes  celecoxib as needed which also works.  Continue celecoxib and chiropractic treatment   - celecoxib (CELEBREX) 200 MG capsule; Take 1 capsule (200 mg total) by mouth daily.  Dispense: 90 capsule; Refill: 1    3. Essential hypertension  Controlled.  Continue lisinopril.  Check CBC basic metabolic panel and urinalysis.  - HM2(CBC w/o Differential)  - Basic Metabolic Panel  - Urinalysis-UC if Indicated    4. Pure hypercholesterolemia  Has borderline hyperlipidemia.  Last lipids showed  and total cholesterol 240 with normal HDL 70 and normal triglycerides 110.  Recheck fasting lipids and liver function.  - Lipid Cascade  - Hepatic Profile    Up-to-date with her preventive screenings including Pap smear and mammogram.      Follow up in 4 months.     History of Present Illness   This 53 y.o. old female is here for her comprehensive physical exam.  Doing very well.  Only complains of chronic right shoulder pains which are work-related.  Getting chiropractic treatment.  Seems to be improving with chiropractic treatment.  Has hypertension controlled by lisinopril.  Has borderline hyperlipidemia on her last visit.  Lipid results are not high enough she needs lipid-lowering treatment.  Sees well with bifocal lenses.  Hears well.  Denies chest pains and shortness of breath.  Denies urinary and bowel symptoms.  Sleeps well.  No other complaints.    Review of  Systems   A 12 point comprehensive review of systems was negative except as noted..     Medications, Allergies and Problem List   Reviewed and updated             Chief Complaint   Annual Exam (fasting, up to date on Mammo, colonoscopy and pap, seeing chiro for R shoulder pain, wants to do physical therapy after PT )       Patient Profile   Social History     Social History Narrative    , 2 grown children, Ranulfo 26 years, Tiffanie May, 19 years. 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         Past Medical History   Patient Active Problem List   Diagnosis     Back pain     Inguinal pain, right     Essential hypertension     Pure hypercholesterolemia       Past Surgical History  She has a past surgical history that includes  section, classic (, ).       Medications and Allergies   Current Outpatient Medications   Medication Sig     celecoxib (CELEBREX) 200 MG capsule Take 1 capsule (200 mg total) by mouth daily.     lisinopril (PRINIVIL,ZESTRIL) 20 MG tablet Take 1 tablet (20 mg total) by mouth daily.     No Known Allergies     Family and Social History   No family history on file.     Social History     Tobacco Use     Smoking status: Never Smoker     Smokeless tobacco: Never Used   Substance Use Topics     Alcohol use: Not on file     Drug use: Not on file        Physical Exam       Physical Exam  /76   Pulse 74   Ht 5' (1.524 m)   Wt 122 lb (55.3 kg)   SpO2 98%   BMI 23.83 kg/m      General Appearance:    Alert, cooperative, no distress, appears stated age   Head:    Normocephalic, without obvious abnormality, atraumatic   Eyes:    PERRL, conjunctiva/corneas clear, EOM's intact, fundi     benign, both eyes   Ears:    Normal TM's and external ear canals, both ears   Nose:   Nares normal, septum midline, mucosa normal, no drainage    or sinus tenderness   Throat:   Lips, mucosa, and tongue normal; teeth and gums normal   Neck:   Supple, symmetrical, trachea  midline, no adenopathy;     thyroid:  no enlargement/tenderness/nodules; no carotid    bruit or JVD   Back:     Symmetric, no curvature, ROM normal, no CVA tenderness   Lungs:     Clear to auscultation bilaterally, respirations unlabored   Chest Wall:    No tenderness or deformity    Heart:    Regular rate and rhythm, S1 and S2 normal, no murmur, rub   or gallop   Breast Exam:    Deferred   Abdomen:     Soft, non-tender, bowel sounds active all four quadrants,     no masses, no organomegaly   Genitalia:    Deferred   Rectal:    Deferred   Extremities:   Extremities normal, atraumatic, no cyanosis or edema   Pulses:   2+ and symmetric all extremities   Skin:   Skin color, texture, turgor normal, no rashes or lesions   Lymph nodes:   Cervical, supraclavicular, and axillary nodes normal   Neurologic:   CNII-XII intact, normal strength, sensation and reflexes     throughout        Additional Information        Jakub Andrea MD  Internal Medicine  Contact me at [phone]     Additional Information   Current Outpatient Medications   Medication Sig     celecoxib (CELEBREX) 200 MG capsule Take 1 capsule (200 mg total) by mouth daily.     lisinopril (PRINIVIL,ZESTRIL) 20 MG tablet Take 1 tablet (20 mg total) by mouth daily.     No Known Allergies  Social History     Tobacco Use     Smoking status: Never Smoker     Smokeless tobacco: Never Used   Substance Use Topics     Alcohol use: Not on file     Drug use: Not on file

## 2021-05-29 NOTE — TELEPHONE ENCOUNTER
Who is calling:  Linette from [facility] for education  Reason for Call:  FMLA forms are incomplete. Please fill out the bottom of the form where it asks for the disability dates. Start and end dates. Fax number is [phone] Thank you   Date of last appointment with primary care: n/a  Okay to leave a detailed message: Yes

## 2021-05-29 NOTE — TELEPHONE ENCOUNTER
Left a detailed message stating results and recommendations from Md below,     Increased lipids. I like to see you for this to start lipid lowering treatment. Please see me again.Rest of your labs are normal, good!      When patient calls back please make appt with Md to discuss elevated lipids.     Naomie Au LPN

## 2021-05-29 NOTE — TELEPHONE ENCOUNTER
RN cannot approve Refill Request    RN can NOT refill this medication med is not covered by policy/route to provider. Last office visit: 5/2/2019 Jakub Andrea MD Last Physical: Visit date not found Last MTM visit: Visit date not found Last visit same specialty: 5/2/2019 Jakub Andrea MD.  Next visit within 3 mo: Visit date not found  Next physical within 3 mo: Visit date not found      Graciela Peacock, [facility] Triage/Med Refill 5/24/2019    Requested Prescriptions   Pending Prescriptions Disp Refills     celecoxib (CELEBREX) 200 MG capsule [Pharmacy Med Name: CELECOXIB 200 MG CAPSULE] 30 capsule 0     Sig: TAKE 1 CAPSULE BY MOUTH EVERY DAY       There is no refill protocol information for this order

## 2021-05-30 VITALS — HEIGHT: 60 IN | BODY MASS INDEX: 23.75 KG/M2 | WEIGHT: 121 LBS

## 2021-05-31 VITALS — WEIGHT: 122 LBS | BODY MASS INDEX: 23.95 KG/M2 | HEIGHT: 60 IN

## 2021-05-31 VITALS — HEIGHT: 60 IN | WEIGHT: 122 LBS | BODY MASS INDEX: 23.95 KG/M2

## 2021-05-31 VITALS — BODY MASS INDEX: 23.97 KG/M2 | WEIGHT: 122.75 LBS

## 2021-05-31 VITALS — BODY MASS INDEX: 23.83 KG/M2 | WEIGHT: 122 LBS

## 2021-06-01 VITALS — BODY MASS INDEX: 24.54 KG/M2 | WEIGHT: 125 LBS | HEIGHT: 60 IN

## 2021-06-01 VITALS — WEIGHT: 126 LBS | BODY MASS INDEX: 24.74 KG/M2 | HEIGHT: 60 IN

## 2021-06-02 VITALS — BODY MASS INDEX: 23.78 KG/M2 | WEIGHT: 121.12 LBS | HEIGHT: 60 IN

## 2021-06-03 VITALS — BODY MASS INDEX: 23.95 KG/M2 | HEIGHT: 60 IN | WEIGHT: 122 LBS

## 2021-06-03 VITALS — HEIGHT: 60 IN | WEIGHT: 122 LBS | BODY MASS INDEX: 23.95 KG/M2

## 2021-06-05 VITALS
WEIGHT: 126 LBS | HEIGHT: 60 IN | OXYGEN SATURATION: 98 % | SYSTOLIC BLOOD PRESSURE: 170 MMHG | DIASTOLIC BLOOD PRESSURE: 90 MMHG | BODY MASS INDEX: 24.74 KG/M2 | HEART RATE: 85 BPM

## 2021-06-05 VITALS
OXYGEN SATURATION: 99 % | DIASTOLIC BLOOD PRESSURE: 88 MMHG | SYSTOLIC BLOOD PRESSURE: 150 MMHG | HEART RATE: 77 BPM | HEIGHT: 60 IN | WEIGHT: 123 LBS | BODY MASS INDEX: 24.15 KG/M2

## 2021-06-05 VITALS
SYSTOLIC BLOOD PRESSURE: 158 MMHG | BODY MASS INDEX: 23.63 KG/M2 | DIASTOLIC BLOOD PRESSURE: 84 MMHG | WEIGHT: 122 LBS | HEART RATE: 88 BPM | OXYGEN SATURATION: 98 %

## 2021-06-05 VITALS
HEART RATE: 75 BPM | SYSTOLIC BLOOD PRESSURE: 136 MMHG | BODY MASS INDEX: 24.74 KG/M2 | HEIGHT: 60 IN | OXYGEN SATURATION: 98 % | DIASTOLIC BLOOD PRESSURE: 88 MMHG | WEIGHT: 126 LBS

## 2021-06-06 NOTE — TELEPHONE ENCOUNTER
Refill Approved    Rx renewed per Medication Renewal Policy. Medication was last renewed on 1/21/19.    Sharon Guerra, [facility] Triage/Med Refill 2/26/2020     Requested Prescriptions   Pending Prescriptions Disp Refills     lisinopriL (PRINIVIL,ZESTRIL) 20 MG tablet [Pharmacy Med Name: LISINOPRIL 20 MG TABLET] 90 tablet 3     Sig: TAKE 1 TABLET BY MOUTH EVERY DAY       [facility] [employer] Refill Protocol Passed - 2/26/2020  1:30 AM        Passed - PCP or prescribing provider visit in past 12 months       Last office visit with prescriber/PCP: 5/2/2019 Jakub Andrea MD OR same dept: 5/2/2019 Jakub Andrea MD OR same specialty: 5/2/2019 Jakub Andrea MD  Last physical: 6/3/2019 Last MTM visit: Visit date not found   Next visit within 3 mo: Visit date not found  Next physical within 3 mo: Visit date not found  Prescriber OR PCP: Jakub Andrea MD  Last diagnosis associated with med order: 1. Essential hypertension  - lisinopriL (PRINIVIL,ZESTRIL) 20 MG tablet [Pharmacy Med Name: LISINOPRIL 20 MG TABLET]; TAKE 1 TABLET BY MOUTH EVERY DAY  Dispense: 90 tablet; Refill: 3    If protocol passes may refill for 12 months if within 3 months of last provider visit (or a total of 15 months).             Passed - Serum Potassium in past 12 months     Lab Results   Component Value Date    Potassium 4.0 06/03/2019             Passed - Blood pressure filed in past 12 months     BP Readings from Last 1 Encounters:   06/03/19 134/76             Passed - Serum Creatinine in past 12 months     Creatinine   Date Value Ref Range Status   06/03/2019 0.70 0.60 - 1.10 mg/dL Final

## 2021-06-11 NOTE — PROGRESS NOTES
Office Visit - Follow Up   Bria Moore   51 y.o. female    Date of Visit: 6/13/2017    Chief Complaint   Patient presents with     Follow-up     ER [facility] High Blood Pressure,        Assessment and Plan   1. Essential hypertension  Found to have increased blood pressure during her Pap smear with her gynecologist yesterday.  Her blood pressure  went up to 188/100.  Was sent to the emergency room at [facility].  Blood pressures went up to 202/98 in the emergency room.  Was given 1 dose of blood pressure medication which I think was hydrochlorothiazide in the gynecologic office.  And when she went to the emergency room her blood pressure gradually went down to  140s over 80s.  Did not receive additional blood pressure medication in the emergency room.  Was discharged and advised to see me.  Today her blood pressure still borderline so I will start her on low-dose lisinopril 2.5 mg daily and will follow her up in 1 month.  - lisinopril (PRINIVIL,ZESTRIL) 2.5 MG tablet; Take 1 tablet (2.5 mg total) by mouth daily.  Dispense: 30 tablet; Refill: 11    2. Urticaria  Has allergic or tachycardia which comes and goes.  Takes fexofenadine which helps.  Needs refill for this.  - fexofenadine (ALLEGRA) 180 MG tablet; Take 1 tablet (180 mg total) by mouth daily.  Dispense: 90 tablet; Refill: 3    Reviewed gynecologic notes and emergency room notes.  Reviewed her low blood pressure and labs.  Discussed these with the patient.  Informed that I will start her on a blood pressure medication to prevent her from having erratic blood pressures when she gets anxious or distressed.    Was provided a prescription for digital blood pressure machine.    Follow up in 4 weeks.     History of Present Illness   This 51 y.o. old female is here for emergency room follow-up.  Had Pap smear with her gynecologist yesterday and was found that her blood pressure was increased.  Was anxious and stressed for this Pap smear.  Was given a dose of  hydrochlorothiazide in the gynecologic office.  Was brought to the emergency room for increased blood pressure.  Was observed emergency room.  Her blood pressure initially was 188/100 in the gynecologic office.  And in the ED went up to 202/98.  But gradually went down without additional BP medication and was just observed.  Did not have any symptoms when her blood pressure was high.  Denied chest pains or shortness of breath.  Was completely asymptomatic.  Today her blood pressure is still borderline.  But feeling and doing well.    Review of Systems   A 12 point comprehensive review of systems was negative except as noted..     Medications, Allergies and Problem List   Reviewed and updated             Chief Complaint   Follow-up (ER [facility] High Blood Pressure,)       Patient Profile   Social History     Social History Narrative        Past Medical History   Patient Active Problem List   Diagnosis     Back pain     Inguinal pain, right       Past Surgical History  She has no past surgical history on file.       Medications and Allergies   Current Outpatient Prescriptions   Medication Sig     acetaminophen (TYLENOL) 325 MG tablet Take 650 mg by mouth 2 (two) times a day.     celecoxib (CELEBREX) 100 MG capsule Take 1 capsule (100 mg total) by mouth daily.     fexofenadine (ALLEGRA) 180 MG tablet Take 1 tablet (180 mg total) by mouth daily.     lisinopril (PRINIVIL,ZESTRIL) 2.5 MG tablet Take 1 tablet (2.5 mg total) by mouth daily.     No Known Allergies     Family and Social History   No family history on file.     Social History   Substance Use Topics     Smoking status: Never Smoker     Smokeless tobacco: Never Used     Alcohol use None        Physical Exam       Physical Exam  /72  Pulse 60  Wt 122 lb (55.3 kg)  BMI 23.83 kg/m2  General appearance: alert, appears stated age, cooperative and no distress  Head: Normocephalic, without obvious abnormality, atraumatic  Throat: lips, mucosa, and tongue normal;  teeth and gums normal  Neck: no adenopathy, no carotid bruit, no JVD, supple, symmetrical, trachea midline and thyroid not enlarged, symmetric, no tenderness/mass/nodules  Lungs: clear to auscultation bilaterally  Heart: regular rate and rhythm, S1, S2 normal, no murmur, click, rub or gallop  Abdomen: soft, non-tender; bowel sounds normal; no masses,  no organomegaly  Extremities: extremities normal, atraumatic, no cyanosis or edema  Skin: Skin color, texture, turgor normal. No rashes or lesions  Neurologic: Grossly normal     Additional Information        Jakub Andrea MD  Internal Medicine  Contact me at [phone]     Additional Information   Current Outpatient Prescriptions   Medication Sig     acetaminophen (TYLENOL) 325 MG tablet Take 650 mg by mouth 2 (two) times a day.     celecoxib (CELEBREX) 100 MG capsule Take 1 capsule (100 mg total) by mouth daily.     fexofenadine (ALLEGRA) 180 MG tablet Take 1 tablet (180 mg total) by mouth daily.     lisinopril (PRINIVIL,ZESTRIL) 2.5 MG tablet Take 1 tablet (2.5 mg total) by mouth daily.     No Known Allergies  Social History   Substance Use Topics     Smoking status: Never Smoker     Smokeless tobacco: Never Used     Alcohol use None         Time: total time spent with the patient was 40 minutes of which >50% was spent in counseling and coordination of care

## 2021-06-11 NOTE — PROGRESS NOTES
Office Visit - Follow Up   Bria Moore   51 y.o. female    Date of Visit: 7/11/2017    Chief Complaint   Patient presents with     Follow-up     Blood Pressure        Assessment and Plan   1. Essential hypertension  Started on lisinopril 2.5 mg daily on her last follow-up.  Checked her blood pressure at home and still runs in the 140-150/80-90.  Still not ideal control.  Will increase lisinopril to 5 mg daily.  - lisinopril (PRINIVIL,ZESTRIL) 5 MG tablet; Take 1 tablet (5 mg total) by mouth daily.  Dispense: 90 tablet; Refill: 3    2. Back pain  Now resolved.  Stopped taking her Celebrex.    Up-to-date with her mammogram and Pap smear.  Pap smear done on 6/12/2017 was normal.  Mammogram done on 1/16/2017 showed bilobed mass in the lateral surface of the left breast but ultrasound showed cyst which is benign.      Follow up in 3 months.       History of Present Illness   This 51 y.o. old female here for follow-up for her hypertension.  Was seen a month ago for increased blood pressure.  Was started on lisinopril 2.5 mg daily.  Was asked to check her blood pressure at home.  Still her blood pressures not ideally controlled.  Blood pressure ran in the 140-150/80-90.  Needs adjustment of her lisinopril dose.  Back pains are now resolved.  Stopped taking her Celebrex.  Overall feels well.    Review of Systems   A 12 point comprehensive review of systems was negative except as noted..     Medications, Allergies and Problem List   Reviewed and updated             Chief Complaint   Follow-up (Blood Pressure)       Patient Profile   Social History     Social History Narrative        Past Medical History   Patient Active Problem List   Diagnosis     Back pain     Inguinal pain, right     Essential hypertension       Past Surgical History  She has no past surgical history on file.       Medications and Allergies   Current Outpatient Prescriptions   Medication Sig     acetaminophen (TYLENOL) 325 MG tablet Take 650 mg by  mouth 2 (two) times a day.     fexofenadine (ALLEGRA) 180 MG tablet Take 1 tablet (180 mg total) by mouth daily.     lisinopril (PRINIVIL,ZESTRIL) 5 MG tablet Take 1 tablet (5 mg total) by mouth daily.     celecoxib (CELEBREX) 100 MG capsule Take 1 capsule (100 mg total) by mouth daily.     No Known Allergies     Family and Social History   No family history on file.     Social History   Substance Use Topics     Smoking status: Never Smoker     Smokeless tobacco: Never Used     Alcohol use None        Physical Exam       Physical Exam  /86  Pulse 76  Wt 122 lb 12 oz (55.7 kg)  BMI 23.97 kg/m2  General appearance: alert, appears stated age, cooperative and no distress  Head: Normocephalic, without obvious abnormality, atraumatic  Neck: no adenopathy, no carotid bruit, no JVD, supple, symmetrical, trachea midline and thyroid not enlarged, symmetric, no tenderness/mass/nodules  Back: symmetric, no curvature. ROM normal. No CVA tenderness.  Lungs: clear to auscultation bilaterally  Heart: regular rate and rhythm, S1, S2 normal, no murmur, click, rub or gallop  Abdomen: soft, non-tender; bowel sounds normal; no masses,  no organomegaly  Extremities: extremities normal, atraumatic, no cyanosis or edema  Skin: Skin color, texture, turgor normal. No rashes or lesions     Additional Information        Jakub Andrea MD  Internal Medicine  Contact me at [phone]     Additional Information   Current Outpatient Prescriptions   Medication Sig     acetaminophen (TYLENOL) 325 MG tablet Take 650 mg by mouth 2 (two) times a day.     fexofenadine (ALLEGRA) 180 MG tablet Take 1 tablet (180 mg total) by mouth daily.     lisinopril (PRINIVIL,ZESTRIL) 5 MG tablet Take 1 tablet (5 mg total) by mouth daily.     celecoxib (CELEBREX) 100 MG capsule Take 1 capsule (100 mg total) by mouth daily.     No Known Allergies  Social History   Substance Use Topics     Smoking status: Never Smoker     Smokeless tobacco: Never Used      Alcohol use None         Time: total time spent with the patient was 25 minutes of which >50% was spent in counseling and coordination of care

## 2021-06-13 NOTE — PROGRESS NOTES
Office Visit - Follow Up   Bria Moore   51 y.o. female    Date of Visit: 10/16/2017    Chief Complaint   Patient presents with     Follow-up     fasting, wants flu shot        Assessment and Plan   1. Essential hypertension  Not ideally controlled.  Takes lisinopril 5 mg daily.  Checks her blood pressure at home and there readings going as high as 150s-170 systolic and mid 90s diastolic.  There are times she gets some neck and  nape pains which I think due to increased blood pressure.  Needs adjustment of her lisinopril.  Increase lisinopril to 5 mg twice a day.  Check the following.  - lisinopril (PRINIVIL,ZESTRIL) 5 MG tablet; Take 1 tablet (5 mg total) by mouth 2 (two) times a day.  Dispense: 180 tablet; Refill: 1  - Lipid Cascade  - HM2(CBC w/o Differential)  - Basic Metabolic Panel  - Hepatic Profile    2. Seasonal allergies  Has seasonal allergies.  Takes fexofenadine or Allegra which works.    3. Insomnia  Has insomnia.  Sleeps around 1:00 and wakes up around 4 or 3 in the morning and on average only sleeps 3 hours a night.  Wants to take something to help her sleep.  Will try zolpidem.  - zolpidem (AMBIEN) 5 MG tablet; Take 1 tablet (5 mg total) by mouth daily.  Dispense: 30 tablet; Refill: 0    4. Need for immunization against influenza  Flu shot was given.  - Influenza, Seasonal Quad, Preservative Free 36+ Months    Follow up in in 6 weeks.     History of Present Illness   This 51 y.o. old female is here for follow-up.  Has hypertension.  Still not ideally controlled by lisinopril 5 mg once a day.  Still exhibits increased blood pressure readings intermittently.  Also complains of nape and neck pains which I think due to her increased blood pressure.  Complains of insomnia.  Unable to sleep well.  Only sleeps on average 3 hours a night.  Has seasonal allergies controlled by fexofenadine.    Review of Systems   A 12 point comprehensive review of systems was negative except as noted..     Medications,  Allergies and Problem List   Reviewed and updated             Chief Complaint   Follow-up (fasting, wants flu shot)       Patient Profile   Social History     Social History Narrative        Past Medical History   Patient Active Problem List   Diagnosis     Back pain     Inguinal pain, right     Essential hypertension       Past Surgical History  She has no past surgical history on file.       Medications and Allergies   Current Outpatient Prescriptions   Medication Sig     acetaminophen (TYLENOL) 325 MG tablet Take 650 mg by mouth 2 (two) times a day.     celecoxib (CELEBREX) 100 MG capsule Take 1 capsule (100 mg total) by mouth daily. (Patient taking differently: Take 100 mg by mouth daily as needed. )     fexofenadine (ALLEGRA) 180 MG tablet Take 1 tablet (180 mg total) by mouth daily. (Patient taking differently: Take 180 mg by mouth daily as needed. )     lisinopril (PRINIVIL,ZESTRIL) 5 MG tablet Take 1 tablet (5 mg total) by mouth 2 (two) times a day.     zolpidem (AMBIEN) 5 MG tablet Take 1 tablet (5 mg total) by mouth daily.     No Known Allergies     Family and Social History   No family history on file.     Social History   Substance Use Topics     Smoking status: Never Smoker     Smokeless tobacco: Never Used     Alcohol use None        Physical Exam       Physical Exam  /90 (Patient Site: Left Arm, Patient Position: Sitting, Cuff Size: Adult Regular)  Pulse 82  Ht 5' (1.524 m)  Wt 122 lb (55.3 kg)  SpO2 99%  BMI 23.83 kg/m2  General appearance: alert, appears stated age, cooperative and no distress  Head: Normocephalic, without obvious abnormality, atraumatic  Throat: lips, mucosa, and tongue normal; teeth and gums normal  Neck: no adenopathy, no carotid bruit, no JVD, supple, symmetrical, trachea midline and thyroid not enlarged, symmetric, no tenderness/mass/nodules  Lungs: clear to auscultation bilaterally  Heart: regular rate and rhythm, S1, S2 normal, no murmur, click, rub or  gallop  Abdomen: soft, non-tender; bowel sounds normal; no masses,  no organomegaly  Extremities: extremities normal, atraumatic, no cyanosis or edema  Skin: Skin color, texture, turgor normal. No rashes or lesions  Neurologic: Grossly normal     Additional Information        Jakub Andrea MD  Internal Medicine  Contact me at [phone]     Additional Information   Current Outpatient Prescriptions   Medication Sig     acetaminophen (TYLENOL) 325 MG tablet Take 650 mg by mouth 2 (two) times a day.     celecoxib (CELEBREX) 100 MG capsule Take 1 capsule (100 mg total) by mouth daily. (Patient taking differently: Take 100 mg by mouth daily as needed. )     fexofenadine (ALLEGRA) 180 MG tablet Take 1 tablet (180 mg total) by mouth daily. (Patient taking differently: Take 180 mg by mouth daily as needed. )     lisinopril (PRINIVIL,ZESTRIL) 5 MG tablet Take 1 tablet (5 mg total) by mouth 2 (two) times a day.     zolpidem (AMBIEN) 5 MG tablet Take 1 tablet (5 mg total) by mouth daily.     No Known Allergies  Social History   Substance Use Topics     Smoking status: Never Smoker     Smokeless tobacco: Never Used     Alcohol use None         Time: total time spent with the patient was 25 minutes of which >50% was spent in counseling and coordination of care

## 2021-06-13 NOTE — PROGRESS NOTES
"Bria Moore is a 54 y.o. female who is being evaluated via a billable telephone visit.      The patient has been notified of following:     \"This telephone visit will be conducted via a call between you and your physician/provider. We have found that certain health care needs can be provided without the need for a physical exam.  This service lets us provide the care you need with a short phone conversation.  If a prescription is necessary we can send it directly to your pharmacy.  If lab work is needed we can place an order for that and you can then stop by our lab to have the test done at a later time.    Telephone visits are billed at different rates depending on your insurance coverage. During this emergency period, for some insurers they may be billed the same as an in-person visit.  Please reach out to your insurance provider with any questions.    If during the course of the call the physician/provider feels a telephone visit is not appropriate, you will not be charged for this service.\"    Patient has given verbal consent to a Telephone visit? Yes    What phone number would you like to be contacted at? [phone]    Patient would like to receive their AVS by AVS Preference: Mail a copy.    Additional provider notes:  Phone visit with Bria for her hyperlipidemia. Has increasing lipids. Not on treatment yet. Last lipids were increased than the last time and her 10 year cardiovascular risk puts her at 8.75% which is almost reaching the cut off to start treatment. Also has hypertension which was not ideally controlled on her last visit. So her lisinopril was increased to 20 mg twice a day.     Assessment/Plan:    1. Pure hypercholesterolemia  Advised to start a lipid lowering med. Will prescribe simvastatin 10 mg daily.   - simvastatin (ZOCOR) 10 MG tablet; Take 1 tablet (10 mg total) by mouth at bedtime.  Dispense: 90 tablet; Refill: 3    2. Essential hypertension  Continue lisinopril 20 mg twice a day. "     Reassess her in 6 weeks.         Phone call duration:  11  minutes

## 2021-06-13 NOTE — TELEPHONE ENCOUNTER
Spoke with the patient's daughter and let her know that Dr. Andrea did send a prescription to the patient's pharmacy for pain.  She verbalized understanding and had no further questions at this time.  Mirtha THAKKAR CMA/JUAN....................11:26 AM

## 2021-06-13 NOTE — PROGRESS NOTES
Office Visit - Physical Exam   Bria Moore   54 y.o. female    Date of Visit: 11/19/2020    Chief Complaint   Patient presents with     Annual Exam        Assessment and Plan   1. Routine general medical examination at a health care facility  Advised her physical exam is normal. But advised she needs female exam for breast exam and PAP smear. Wants these done by a female MD.     2. Essential hypertension  Not ideally controlled this morning. Tells me she dis not take her lisinopril. Advised to take this daily. Advised to check her blood pressure daily and record for me until her next visit.   - HM2(CBC w/o Differential)  - Urinalysis-UC if Indicated  - Basic Metabolic Panel    3. Pure hypercholesterolemia  Shows increased lipids now. May need treatment after I get her fasting lipids.   - Lipid Cascade  - Thyroid Stimulating Hormone (TSH)  - Hepatic Profile    4. Primary osteoarthritis of right hip  Complains of right hip pains which come off and on. Pains are mild to moderate intensity but able to handle her pains. Pains are worse with prolonged walking. Advised she has osteoarthritis causing her pains. Will treat with celecoxib.   - celecoxib (CELEBREX) 200 MG capsule; Take 1 capsule (200 mg total) by mouth daily.  Dispense: 30 capsule; Refill: 2    5. Menopause  Menopause started early this year. Has not had menses since January. Only had some spotting in May. Now her menses has completely stopped. Does not experience menopausal symptoms.   - Vitamin D, Total (25-Hydroxy)    6. Visit for screening mammogram  Due for mammogram. Will schedule this.   - Mammo Screening Bilateral; Future    7. Screening for malignant neoplasm of cervix  Due for pap smear. Will refer to gyne.   - Ambulatory referral to Gynecology    Flu shot was given.      Follow up in  4 weeks for her increased blood pressure and increased lipids.      History of Present Illness   This 54 y.o. old female is here for her comprehensive physical  exam. Overall, feels well. Due to for PAP smear but wants to see a female MD. Also due for her mammogram. Has hypertension not ideally controlled today. Takes lisinopril 2- mg daily. Also has been having increased lipids. Will possibly need a medication for this. Only complains of intermittent right hip pains which gets worse with prolonged walking. Menses stopped early this year. Denies menopausal symptoms. Sees well with her glasses for reading and distance. Denies hearing problems. Does not have chest pains and shortness of breath. Gets some heartburn with hot and acidic foods. Denies urinary and bowel symptoms. Does not sleep well. Took melatonin without improvement. Now takes her diphenhydramine and she sleeps better.      Review of Systems   A 12 point comprehensive review of systems was negative except as noted..     Medications, Allergies and Problem List   Reviewed and updated             Chief Complaint   Annual Exam       Patient Profile   Social History     Social History Narrative    , 2 grown children, Ranulfo 26 years, Tiffanie May, 19 years. 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         Past Medical History   Patient Active Problem List   Diagnosis     Back pain     Inguinal pain, right     Essential hypertension     Pure hypercholesterolemia       Past Surgical History  She has a past surgical history that includes  section, classic (, ).       Medications and Allergies   Current Outpatient Medications   Medication Sig     lisinopriL (PRINIVIL,ZESTRIL) 20 MG tablet Take 1 tablet (20 mg total) by mouth daily.     celecoxib (CELEBREX) 200 MG capsule Take 1 capsule (200 mg total) by mouth daily.     No Known Allergies     Family and Social History   No family history on file.     Social History     Tobacco Use     Smoking status: Never Smoker     Smokeless tobacco: Never Used   Substance Use Topics     Alcohol use: Not on file     Drug use: Not on file     "    Physical Exam       Physical Exam  /88   Pulse 75   Ht 5' 0.25\" (1.53 m)   Wt 126 lb (57.2 kg)   SpO2 98%   BMI 24.40 kg/m      General Appearance:    Alert, cooperative, no distress, appears stated age   Head:    Normocephalic, without obvious abnormality, atraumatic   Eyes:    PERRL, conjunctiva/corneas clear, EOM's intact, fundi     benign, both eyes   Ears:    Normal TM's and external ear canals, both ears   Nose:   Nares normal, septum midline, mucosa normal, no drainage    or sinus tenderness   Throat:   Lips, mucosa, and tongue normal; teeth and gums normal   Neck:   Supple, symmetrical, trachea midline, no adenopathy;     thyroid:  no enlargement/tenderness/nodules; no carotid    bruit or JVD   Back:     Symmetric, no curvature, ROM normal, no CVA tenderness   Lungs:     Clear to auscultation bilaterally, respirations unlabored   Chest Wall:    No tenderness or deformity    Heart:    Regular rate and rhythm, S1 and S2 normal, no murmur, rub   or gallop   Breast Exam:    deferred   Abdomen:     Soft, non-tender, bowel sounds active all four quadrants,     no masses, no organomegaly   Genitalia:    deferred   Rectal:    deferred   Extremities:   Extremities normal, atraumatic, no cyanosis or edema   Pulses:   2+ and symmetric all extremities   Skin:   Skin color, texture, turgor normal, no rashes or lesions   Lymph nodes:   Cervical, supraclavicular, and axillary nodes normal   Neurologic:   CNII-XII intact, normal strength, sensation and reflexes     throughout        Additional Information   Post Discharge Medication Reconciliation Status: discharge medications reconciled, continue medications without change      Jakub Andrea MD  Internal Medicine  Contact me at [phone]     Additional Information   Current Outpatient Medications   Medication Sig     lisinopriL (PRINIVIL,ZESTRIL) 20 MG tablet Take 1 tablet (20 mg total) by mouth daily.     celecoxib (CELEBREX) 200 MG capsule Take 1 " capsule (200 mg total) by mouth daily.     No Known Allergies  Social History     Tobacco Use     Smoking status: Never Smoker     Smokeless tobacco: Never Used   Substance Use Topics     Alcohol use: Not on file     Drug use: Not on file

## 2021-06-13 NOTE — PROGRESS NOTES
Office Visit - Follow Up   Bria Moore   55 y.o. female    Date of Visit: 2020    Chief Complaint   Patient presents with     Follow-up        Assessment and Plan   1. Essential hypertension  Not ideally controlled. Supposed to take her lisinopril 2 times a day but only takes this daily at most times. Only takes lisinopril in the pm when her bp is up. Advised to take lisinopril twice a day. Advised to check and record her blood pressure for me for my review next visit.   - HM2(CBC w/o Differential)  - Basic Metabolic Panel    2. Pure hypercholesterolemia  Started simvastatin 4 weeks ago. Will check her fasting lipids.   - Lipid Cascade  - Hepatic Profile      Follow up in 6 weeks.      History of Present Illness   This 55 y.o. old female is here for follow up. Has hypertension not controlled today. Prescribed lisinopril 20 mg twice a day but only takes it once a day. Found to have increased lipids and was started on simvastatin. Overall, feels well. No complaints.      Review of Systems   A 12 point comprehensive review of systems was negative except as noted..     Medications, Allergies and Problem List   Reviewed and updated             Chief Complaint   Follow-up       Patient Profile   Social History     Social History Narrative    , 2 grown children, Ranulfo 26 years, Tiffanie May, 19 years. 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         Past Medical History   Patient Active Problem List   Diagnosis     Back pain     Inguinal pain, right     Essential hypertension     Pure hypercholesterolemia       Past Surgical History  She has a past surgical history that includes  section, classic (, ).       Medications and Allergies   Current Outpatient Medications   Medication Sig     celecoxib (CELEBREX) 200 MG capsule Take 1 capsule (200 mg total) by mouth daily.     cholecalciferol, vitamin D3, 50 mcg (2,000 unit) Tab Take by mouth.     lisinopriL (PRINIVIL,ZESTRIL)  "20 MG tablet TAKE 1 TABLET BY MOUTH EVERY DAY     simvastatin (ZOCOR) 10 MG tablet Take 1 tablet (10 mg total) by mouth at bedtime.     No Known Allergies     Family and Social History   No family history on file.     Social History     Tobacco Use     Smoking status: Never Smoker     Smokeless tobacco: Never Used   Substance Use Topics     Alcohol use: Not on file     Drug use: Not on file        Physical Exam       Physical Exam  /90   Pulse 85   Ht 5' 0.25\" (1.53 m)   Wt 126 lb (57.2 kg)   SpO2 98%   BMI 24.40 kg/m    General appearance: alert, appears stated age, cooperative and no distress  Head: Normocephalic, without obvious abnormality, atraumatic  Throat: lips, mucosa, and tongue normal; teeth and gums normal  Neck: no adenopathy, no carotid bruit, no JVD, supple, symmetrical, trachea midline and thyroid not enlarged, symmetric, no tenderness/mass/nodules  Lungs: clear to auscultation bilaterally  Heart: regular rate and rhythm, S1, S2 normal, no murmur, click, rub or gallop  Abdomen: soft, non-tender; bowel sounds normal; no masses,  no organomegaly  Extremities: extremities normal, atraumatic, no cyanosis or edema  Skin: Skin color, texture, turgor normal. No rashes or lesions     Additional Information   Post Discharge Medication Reconciliation Status: discharge medications reconciled, continue medications without change      Jakub Andrea MD  Internal Medicine  Contact me at [phone]     Additional Information   Current Outpatient Medications   Medication Sig     celecoxib (CELEBREX) 200 MG capsule Take 1 capsule (200 mg total) by mouth daily.     cholecalciferol, vitamin D3, 50 mcg (2,000 unit) Tab Take by mouth.     lisinopriL (PRINIVIL,ZESTRIL) 20 MG tablet TAKE 1 TABLET BY MOUTH EVERY DAY     simvastatin (ZOCOR) 10 MG tablet Take 1 tablet (10 mg total) by mouth at bedtime.     No Known Allergies  Social History     Tobacco Use     Smoking status: Never Smoker     Smokeless " tobacco: Never Used   Substance Use Topics     Alcohol use: Not on file     Drug use: Not on file         Time: total time spent with the patient was 25 minutes of which >50% was spent in counseling and coordination of care

## 2021-06-13 NOTE — TELEPHONE ENCOUNTER
Refill Approved    Rx renewed per Medication Renewal Policy. Medication was last renewed on 2/226/20.    Katie Tobar, [facility] Triage/Med Refill 12/17/2020     Requested Prescriptions   Pending Prescriptions Disp Refills     lisinopriL (PRINIVIL,ZESTRIL) 20 MG tablet [Pharmacy Med Name: LISINOPRIL 20 MG TABLET] 90 tablet 2     Sig: TAKE 1 TABLET BY MOUTH EVERY DAY       [facility] Refill Protocol Passed - 12/16/2020 12:18 AM        Passed - PCP or prescribing provider visit in past 12 months       Last office visit with prescriber/PCP: 5/2/2019 Jakub Andrea MD OR same dept: Visit date not found OR same specialty: 5/2/2019 Jakub Andrea MD  Last physical: 11/19/2020 Last MTM visit: Visit date not found   Next visit within 3 mo: Visit date not found  Next physical within 3 mo: Visit date not found  Prescriber OR PCP: Jakub Andrea MD  Last diagnosis associated with med order: 1. Essential hypertension  - lisinopriL (PRINIVIL,ZESTRIL) 20 MG tablet [Pharmacy Med Name: LISINOPRIL 20 MG TABLET]; TAKE 1 TABLET BY MOUTH EVERY DAY  Dispense: 90 tablet; Refill: 2    If protocol passes may refill for 12 months if within 3 months of last provider visit (or a total of 15 months).             Passed - Serum Potassium in past 12 months     Lab Results   Component Value Date    Potassium 4.1 11/19/2020             Passed - Blood pressure filed in past 12 months     BP Readings from Last 1 Encounters:   11/19/20 136/88             Passed - Serum Creatinine in past 12 months     Creatinine   Date Value Ref Range Status   11/19/2020 0.70 0.60 - 1.10 mg/dL Final

## 2021-06-14 NOTE — TELEPHONE ENCOUNTER
Fax came from pharmacy stating patient said she takes 2 tabs daily. Should she be taking 40 mg daily or 2 tabs two times a day?   Flory Reynolds CSS    1. Essential hypertension  Not ideally controlled. Supposed to take her lisinopril 2 times a day but only takes this daily at most times. Only takes lisinopril in the pm when her bp is up. Advised to take lisinopril twice a day

## 2021-06-14 NOTE — PROGRESS NOTES
Office Visit - Follow Up   Bria Moore   52 y.o. female    Date of Visit: 12/18/2017    Chief Complaint   Patient presents with     Follow-up     not fasting, low back pain and abdominal pain x1 week        Assessment and Plan   1. Essential hypertension  Not ideally controlled because she ran out of her lisinopril. Has  not taken lisinopril for a few days.  Will refill lisinopril good for 3 months with 1 year refill.  Check the following.  - lisinopril (PRINIVIL,ZESTRIL) 10 MG tablet; Take 1 tablet (10 mg total) by mouth daily.  Dispense: 90 tablet; Refill: 3  - Lipid Cascade  - HM2(CBC w/o Differential)  - Basic Metabolic Panel  - Hepatic Profile    2. Back pain  Has back pains due to osteoarthritis.  Learns to live with her back pains.    3. Right hip pain  Also has right hip pains due to osteoarthritis.  Learns to live with her pains.    4. Frequency of urination  Complains of frequency of urination but no dysuria, hesitancy, urgency.  Will check urinalysis.  Suspect due to too much of water intake.  - Urinalysis-UC if Indicated    Follow up in 3 months.     History of Present Illness   This 52 y.o. old female is here for follow-up.  Has hypertension.  Not well controlled today because she ran out of her lisinopril.  Has not taken her lisinopril for several days.  Complains of back pains and right hip pains due to osteoarthritis.  Pains come and go.  Living learns to live with her pains.  Also complains of frequency of urination.  But  does not have any other urinary symptoms.  Overall feels well.    Review of Systems   A 12 point comprehensive review of systems was negative except as noted..     Medications, Allergies and Problem List   Reviewed and updated             Chief Complaint   Follow-up (not fasting, low back pain and abdominal pain x1 week)       Patient Profile   Social History     Social History Narrative        Past Medical History   Patient Active Problem List   Diagnosis     Back pain      Inguinal pain, right     Essential hypertension       Past Surgical History  She has no past surgical history on file.       Medications and Allergies   Current Outpatient Prescriptions   Medication Sig     acetaminophen (TYLENOL) 325 MG tablet Take 650 mg by mouth 2 (two) times a day.     celecoxib (CELEBREX) 100 MG capsule Take 1 capsule (100 mg total) by mouth daily. (Patient taking differently: Take 100 mg by mouth daily as needed. )     fexofenadine (ALLEGRA) 180 MG tablet Take 1 tablet (180 mg total) by mouth daily. (Patient taking differently: Take 180 mg by mouth daily as needed. )     lisinopril (PRINIVIL,ZESTRIL) 10 MG tablet Take 1 tablet (10 mg total) by mouth daily.     zolpidem (AMBIEN) 5 MG tablet Take 1 tablet (5 mg total) by mouth daily.     No Known Allergies     Family and Social History   No family history on file.     Social History   Substance Use Topics     Smoking status: Never Smoker     Smokeless tobacco: Never Used     Alcohol use None        Physical Exam       Physical Exam  /78  Pulse 81  Ht 5' (1.524 m)  Wt 122 lb (55.3 kg)  SpO2 98%  BMI 23.83 kg/m2  General appearance: alert, appears stated age, cooperative and no distress  Head: Normocephalic, without obvious abnormality, atraumatic  Throat: lips, mucosa, and tongue normal; teeth and gums normal  Neck: no adenopathy, no carotid bruit, no JVD, supple, symmetrical, trachea midline and thyroid not enlarged, symmetric, no tenderness/mass/nodules  Back: symmetric, no curvature. ROM normal. No CVA tenderness.  Lungs: clear to auscultation bilaterally  Heart: regular rate and rhythm, S1, S2 normal, no murmur, click, rub or gallop  Abdomen: soft, non-tender; bowel sounds normal; no masses,  no organomegaly  Extremities: extremities normal, atraumatic, no cyanosis or edema  Skin: Skin color, texture, turgor normal. No rashes or lesions     Additional Information        Jakub Andrea MD  Internal Medicine  Contact me at  [phone]     Additional Information   Current Outpatient Prescriptions   Medication Sig     acetaminophen (TYLENOL) 325 MG tablet Take 650 mg by mouth 2 (two) times a day.     celecoxib (CELEBREX) 100 MG capsule Take 1 capsule (100 mg total) by mouth daily. (Patient taking differently: Take 100 mg by mouth daily as needed. )     fexofenadine (ALLEGRA) 180 MG tablet Take 1 tablet (180 mg total) by mouth daily. (Patient taking differently: Take 180 mg by mouth daily as needed. )     lisinopril (PRINIVIL,ZESTRIL) 10 MG tablet Take 1 tablet (10 mg total) by mouth daily.     zolpidem (AMBIEN) 5 MG tablet Take 1 tablet (5 mg total) by mouth daily.     No Known Allergies  Social History   Substance Use Topics     Smoking status: Never Smoker     Smokeless tobacco: Never Used     Alcohol use None         Time: total time spent with the patient was 25 minutes of which >50% was spent in counseling and coordination of care

## 2021-06-14 NOTE — PROGRESS NOTES
Office Visit - Follow Up   Bria Moore   55 y.o. female    Date of Visit: 2021    Chief Complaint   Patient presents with     Hypertension     follow up, forgot BP logs at home, states they have been good. fasting        Assessment and Plan   1. Essential hypertension  Not controlled despite lisinopril. Will add amlodipine. Asked to see me again in 6 weeks for her hypertension follow up.   - amLODIPine (NORVASC) 5 MG tablet; Take 1 tablet (5 mg total) by mouth daily.  Dispense: 90 tablet; Refill: 3  - HM2(CBC w/o Differential)  - Basic Metabolic Panel    2. Mixed hyperlipidemia  Controlled. Continue simvastatin.   - Lipid Cascade  - Hepatic Profile    Found to have positive COVID more than 2 weeks ago. Had cough but did not have fever. Was tested positive. Now doing well. Back to her baseline.       Follow up in 6 weeks.      History of Present Illness   This 55 y.o. old female is here for follow up. Has hypertension. Still not controlled despite lisinopril. Has hyperlipidemia controlled by simvastatin. Had COVID more than 2 weeks ago. Tested positive after she complained of cough. Now she is cured. Back to her baseline and back to work. Does not have complaints.      Review of Systems   A 12 point comprehensive review of systems was negative except as noted..     Medications, Allergies and Problem List   Reviewed and updated             Chief Complaint   Hypertension (follow up, forgot BP logs at home, states they have been good. fasting)       Patient Profile   Social History     Social History Narrative    , 2 grown children, Ranulfo 26 years, Tiffanie May, 19 years. 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         Past Medical History   Patient Active Problem List   Diagnosis     Back pain     Inguinal pain, right     Essential hypertension       Past Surgical History  She has a past surgical history that includes  section, classic (, ).       Medications and  "Allergies   Current Outpatient Medications   Medication Sig     celecoxib (CELEBREX) 200 MG capsule Take 1 capsule (200 mg total) by mouth daily.     cholecalciferol, vitamin D3, 50 mcg (2,000 unit) Tab Take by mouth.     lisinopriL (PRINIVIL,ZESTRIL) 20 MG tablet TAKE 1 TABLET BY MOUTH EVERY DAY     simvastatin (ZOCOR) 10 MG tablet Take 1 tablet (10 mg total) by mouth at bedtime.     amLODIPine (NORVASC) 5 MG tablet Take 1 tablet (5 mg total) by mouth daily.     No Known Allergies     Family and Social History   No family history on file.     Social History     Tobacco Use     Smoking status: Never Smoker     Smokeless tobacco: Never Used   Substance Use Topics     Alcohol use: Not on file     Drug use: Not on file        Physical Exam       Physical Exam  /88 (Patient Site: Right Arm, Patient Position: Sitting, Cuff Size: Adult Small)   Pulse 77   Ht 5' 0.25\" (1.53 m)   Wt 123 lb (55.8 kg)   SpO2 99%   BMI 23.82 kg/m    General appearance: alert, appears stated age, cooperative and no distress  Throat: lips, mucosa, and tongue normal; teeth and gums normal  Neck: no adenopathy, no carotid bruit, no JVD, supple, symmetrical, trachea midline and thyroid not enlarged, symmetric, no tenderness/mass/nodules  Lungs: clear to auscultation bilaterally  Heart: regular rate and rhythm, S1, S2 normal, no murmur, click, rub or gallop  Abdomen: soft, non-tender; bowel sounds normal; no masses,  no organomegaly  Extremities: extremities normal, atraumatic, no cyanosis or edema  Skin: Skin color, texture, turgor normal. No rashes or lesions     Additional Information   Post Discharge Medication Reconciliation Status: discharge medications reconciled and changed, per note/orders      Jakub Andrea MD  Internal Medicine  Contact me at [phone]     Additional Information   Current Outpatient Medications   Medication Sig     celecoxib (CELEBREX) 200 MG capsule Take 1 capsule (200 mg total) by mouth daily.     " cholecalciferol, vitamin D3, 50 mcg (2,000 unit) Tab Take by mouth.     lisinopriL (PRINIVIL,ZESTRIL) 20 MG tablet TAKE 1 TABLET BY MOUTH EVERY DAY     simvastatin (ZOCOR) 10 MG tablet Take 1 tablet (10 mg total) by mouth at bedtime.     amLODIPine (NORVASC) 5 MG tablet Take 1 tablet (5 mg total) by mouth daily.     No Known Allergies  Social History     Tobacco Use     Smoking status: Never Smoker     Smokeless tobacco: Never Used   Substance Use Topics     Alcohol use: Not on file     Drug use: Not on file         Time: total time spent with the patient was 25 minutes of which >50% was spent in counseling and coordination of care

## 2021-06-15 NOTE — PROGRESS NOTES
Office Visit - Follow Up   Bria Moore   55 y.o. female    Date of Visit: 3/11/2021    Chief Complaint   Patient presents with     Follow-up     HTN        Assessment and Plan   1. Essential hypertension  Better controlled but still not ideal even with addition of 5 mg of amlodipine to her lisinopril 20 mh twice a day. Checks her blood pressure at home, still shows 140 to 150 systolic with normal diastolic pressure. Advised to increase her amlodipine 5 mg to twice a day and continue lisinopril 20 mg twice a day. Will provide new refills for both meds.   - lisinopriL (PRINIVIL,ZESTRIL) 20 MG tablet; Take 1 tablet (20 mg total) by mouth 2 (two) times a day.  Dispense: 180 tablet; Refill: 3  - amLODIPine (NORVASC) 5 MG tablet; Take 1 tablet (5 mg total) by mouth 2 (two) times a day.  Dispense: 180 tablet; Refill: 3    2. Mixed hyperlipidemia  Controlled. Last lipids were good. Continue simvastatin.     3. Vitamin D deficiency  Supplemented by vitamin D. Continue same dose of vitamin D 2000 units daily.       Follow up in 4 months.      History of Present Illness   This 55 y.o. old female is here for follow up of her hypertension. Had increased blood pressure on her last visit. Also noted increased blood pressure at home. Takes lisinopril already. Amlodipine 5 mg daily was added. Continues to check her blood pressure at home her blood pressure is still not smoothly controlled. Still has blood pressure of 140 to 150 systolic with normal diastolic pressure. Has hyperlipidemia controlled by simvastatin. Has vitamin D deficiency supplemented by vitamin D. Overall, feels well. Does not have complaints.     Review of Systems   A 12 point comprehensive review of systems was negative except as noted..     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         Past Medical History   Patient Active Problem List   Diagnosis     Back pain     Inguinal pain, right     Essential hypertension       Past Surgical History  She has a past surgical history that includes  section, classic (, ).       Medications and Allergies   Current Outpatient Medications   Medication Sig     amLODIPine (NORVASC) 5 MG tablet Take 1 tablet (5 mg total) by mouth 2 (two) times a day.     celecoxib (CELEBREX) 200 MG capsule Take 1 capsule (200 mg total) by mouth daily.     cholecalciferol, vitamin D3, 50 mcg (2,000 unit) Tab Take by mouth.     lisinopriL (PRINIVIL,ZESTRIL) 20 MG tablet Take 1 tablet (20 mg total) by mouth 2 (two) times a day.     simvastatin (ZOCOR) 10 MG tablet Take 1 tablet (10 mg total) by mouth at bedtime.     No Known Allergies     Family and Social History   No family history on file.     Social History     Tobacco Use     Smoking status: Never Smoker     Smokeless tobacco: Never Used   Substance Use Topics     Alcohol use: Not on file     Drug use: Not on file        Physical Exam       Physical Exam  /84   Pulse 88   Wt 122 lb (55.3 kg)   SpO2 98%   BMI 23.63 kg/m    General appearance: alert, appears stated age, cooperative and no distress  Head: Normocephalic, without obvious abnormality, atraumatic  Throat: lips, mucosa, and tongue normal; teeth and gums normal  Neck: no adenopathy, no carotid bruit, no JVD, supple, symmetrical, trachea midline and thyroid not enlarged, symmetric, no tenderness/mass/nodules  Lungs: clear to auscultation bilaterally  Heart: regular rate and rhythm, S1, S2 normal, no murmur, click, rub or gallop  Abdomen: soft, non-tender; bowel sounds normal; no masses,  no organomegaly  Extremities: extremities normal, atraumatic, no cyanosis or edema     Additional Information   Post Discharge Medication Reconciliation Status: discharge  medications reconciled and changed, per note/orders      Jakub Andrea MD  Internal Medicine  Contact me at [phone]     Additional Information   Current Outpatient Medications   Medication Sig     amLODIPine (NORVASC) 5 MG tablet Take 1 tablet (5 mg total) by mouth 2 (two) times a day.     celecoxib (CELEBREX) 200 MG capsule Take 1 capsule (200 mg total) by mouth daily.     cholecalciferol, vitamin D3, 50 mcg (2,000 unit) Tab Take by mouth.     lisinopriL (PRINIVIL,ZESTRIL) 20 MG tablet Take 1 tablet (20 mg total) by mouth 2 (two) times a day.     simvastatin (ZOCOR) 10 MG tablet Take 1 tablet (10 mg total) by mouth at bedtime.     No Known Allergies  Social History     Tobacco Use     Smoking status: Never Smoker     Smokeless tobacco: Never Used   Substance Use Topics     Alcohol use: Not on file     Drug use: Not on file

## 2021-06-16 PROBLEM — E55.9 VITAMIN D DEFICIENCY: Status: ACTIVE | Noted: 2021-03-11

## 2021-06-16 PROBLEM — E78.2 MIXED HYPERLIPIDEMIA: Status: ACTIVE | Noted: 2021-03-11

## 2021-06-16 PROBLEM — I10 ESSENTIAL HYPERTENSION: Status: ACTIVE | Noted: 2017-07-11

## 2021-06-16 NOTE — PROGRESS NOTES
Office Visit - Follow Up   Bria Moore   52 y.o. female    Date of Visit: 3/19/2018    Chief Complaint   Patient presents with     Follow-up     fasting, declines tetanus shot, needs colonoscopy         Assessment and Plan   1. Essential hypertension  Now controlled by lisinopril 10 mg daily.  Continue lisinopril.  Check the following.  - lisinopril (PRINIVIL,ZESTRIL) 10 MG tablet; Take 1 tablet (10 mg total) by mouth daily.  Dispense: 90 tablet; Refill: 3  - Lipid Cascade  - HM2(CBC w/o Differential)  - Comprehensive Metabolic Panel    2. Menstrual pain  Has some menstrual pain which she attributes to her lower and inguinal pains off and on.  But apparently she is now having erratic menstruation.  She is nearing her menopausal state.  I think she is now  perimenopausal.    3. Screen for colon cancer  Due for colon cancer screening.  Agrees to have colonoscopy.  - Ambulatory referral for Colonoscopy    Follow up in 4 months.     History of Present Illness   This 52 y.o. old female is here for follow-up.  Has hypertension.  Controlled by lisinopril 10 mg daily.  Still has some intermittent abdominal pains radiating to the groin.  Happens only when she has menstruation.  But reports she is now in the perimenopausal  state since her menstruation is now erratic.  Due for colon cancer screening.  Overall feels well.    Review of Systems   A 12 point comprehensive review of systems was negative except as noted..     Medications, Allergies and Problem List   Reviewed and updated             Chief Complaint   Follow-up (fasting, declines tetanus shot, needs colonoscopy )       Patient Profile   Social History     Social History Narrative        Past Medical History   Patient Active Problem List   Diagnosis     Back pain     Inguinal pain, right     Essential hypertension       Past Surgical History  She has no past surgical history on file.       Medications and Allergies   Current Outpatient Prescriptions   Medication  Sig     lisinopril (PRINIVIL,ZESTRIL) 10 MG tablet Take 1 tablet (10 mg total) by mouth daily.     No Known Allergies     Family and Social History   No family history on file.     Social History   Substance Use Topics     Smoking status: Never Smoker     Smokeless tobacco: Never Used     Alcohol use None        Physical Exam       Physical Exam  /80  Pulse 81  Ht 5' (1.524 m)  Wt 126 lb (57.2 kg)  SpO2 99%  BMI 24.61 kg/m2  General appearance: alert, appears stated age, cooperative and no distress  Head: Normocephalic, without obvious abnormality, atraumatic  Throat: lips, mucosa, and tongue normal; teeth and gums normal  Neck: no adenopathy, no carotid bruit, no JVD, supple, symmetrical, trachea midline and thyroid not enlarged, symmetric, no tenderness/mass/nodules  Lungs: clear to auscultation bilaterally  Heart: regular rate and rhythm, S1, S2 normal, no murmur, click, rub or gallop  Abdomen: soft, non-tender; bowel sounds normal; no masses,  no organomegaly  Extremities: extremities normal, atraumatic, no cyanosis or edema  Skin: Skin color, texture, turgor normal. No rashes or lesions  Neurologic: Grossly normal     Additional Information        Jakub Andrea MD  Internal Medicine  Contact me at [phone]     Additional Information   Current Outpatient Prescriptions   Medication Sig     lisinopril (PRINIVIL,ZESTRIL) 10 MG tablet Take 1 tablet (10 mg total) by mouth daily.     No Known Allergies  Social History   Substance Use Topics     Smoking status: Never Smoker     Smokeless tobacco: Never Used     Alcohol use None         Time: total time spent with the patient was 25 minutes of which >50% was spent in counseling and coordination of care

## 2021-06-18 NOTE — LETTER
Letter by Jakub Andrea MD at      Author: Jakub Andrea MD Service: -- Author Type: --    Filed:  Encounter Date: 1/22/2019 Status: (Other)       Bria Moore  [address] [place] [address]             January 22, 2019         Dear Ms. Moore,    Below are the results from your recent visit:    Resulted Orders   HM2(CBC w/o Differential)   Result Value Ref Range    WBC 4.5 4.0 - 11.0 thou/uL    RBC 3.88 3.80 - 5.40 mill/uL    Hemoglobin 13.2 12.0 - 16.0 g/dL    Hematocrit 39.7 35.0 - 47.0 %     (H) 80 - 100 fL    MCH 33.9 27.0 - 34.0 pg    MCHC 33.3 32.0 - 36.0 g/dL    RDW 11.2 11.0 - 14.5 %    Platelets 332 140 - 440 thou/uL    MPV 7.6 7.0 - 10.0 fL   Lipid Cascade   Result Value Ref Range    Cholesterol 240 (H) <=199 mg/dL    Triglycerides 110 <=149 mg/dL    HDL Cholesterol 70 >=50 mg/dL    LDL Calculated 148 (H) <=129 mg/dL    Patient Fasting > 8hrs? Yes    Thyroid Stimulating Hormone (TSH)   Result Value Ref Range    TSH 1.20 0.30 - 5.00 uIU/mL   Basic Metabolic Panel   Result Value Ref Range    Sodium 140 136 - 145 mmol/L    Potassium 4.1 3.5 - 5.0 mmol/L    Chloride 106 98 - 107 mmol/L    CO2 22 22 - 31 mmol/L    Anion Gap, Calculation 12 5 - 18 mmol/L    Glucose 100 70 - 125 mg/dL    Calcium 10.0 8.5 - 10.5 mg/dL    BUN 10 8 - 22 mg/dL    Creatinine 0.71 0.60 - 1.10 mg/dL    GFR MDRD Af Amer >60 >60 mL/min/1.73m2    GFR MDRD Non Af Amer >60 >60 mL/min/1.73m2    Narrative    Fasting Glucose reference range is 70-99 mg/dL per  American Diabetes Association (ADA) guidelines.   Hepatic Profile   Result Value Ref Range    Bilirubin, Total 0.7 0.0 - 1.0 mg/dL    Bilirubin, Direct 0.2 <=0.5 mg/dL    Protein, Total 8.9 (H) 6.0 - 8.0 g/dL    Albumin 4.2 3.5 - 5.0 g/dL    Alkaline Phosphatase 88 45 - 120 U/L    AST 31 0 - 40 U/L    ALT 30 0 - 45 U/L       Increased lipids than the last time. Will repeat these next visit. In the meantime avoid fatty or high cholesterol diet. If these continue to  increase you will need a medication to lower these.     Otherwise, rest of your labs are normal. Some insignificant changes of no clinical importance.     Thanks.     Please call with questions or contact us using [employer].    Sincerely,        Electronically signed by Jakub Andrea MD

## 2021-06-19 NOTE — LETTER
Letter by Jakub Andrea MD at      Author: Jakub Andrea MD Service: -- Author Type: --    Filed:  Encounter Date: 6/4/2019 Status: (Other)         Bria Moore  [address] [place] [address]             June 4, 2019         Dear Ms. Moore,    Below are the results from your recent visit:    Resulted Orders   Lipid Cascade   Result Value Ref Range    Cholesterol 241 (H) <=199 mg/dL    Triglycerides 106 <=149 mg/dL    HDL Cholesterol 66 >=50 mg/dL    LDL Calculated 154 (H) <=129 mg/dL    Patient Fasting > 8hrs? Yes    HM2(CBC w/o Differential)   Result Value Ref Range    WBC 4.0 4.0 - 11.0 thou/uL    RBC 4.03 3.80 - 5.40 mill/uL    Hemoglobin 13.7 12.0 - 16.0 g/dL    Hematocrit 40.5 35.0 - 47.0 %     (H) 80 - 100 fL    MCH 34.1 (H) 27.0 - 34.0 pg    MCHC 33.9 32.0 - 36.0 g/dL    RDW 11.0 11.0 - 14.5 %    Platelets 342 140 - 440 thou/uL    MPV 7.7 7.0 - 10.0 fL   Basic Metabolic Panel   Result Value Ref Range    Sodium 142 136 - 145 mmol/L    Potassium 4.0 3.5 - 5.0 mmol/L    Chloride 106 98 - 107 mmol/L    CO2 25 22 - 31 mmol/L    Anion Gap, Calculation 11 5 - 18 mmol/L    Glucose 101 70 - 125 mg/dL    Calcium 10.3 8.5 - 10.5 mg/dL    BUN 11 8 - 22 mg/dL    Creatinine 0.70 0.60 - 1.10 mg/dL    GFR MDRD Af Amer >60 >60 mL/min/1.73m2    GFR MDRD Non Af Amer >60 >60 mL/min/1.73m2    Narrative    Fasting Glucose reference range is 70-99 mg/dL per  American Diabetes Association (ADA) guidelines.   Hepatic Profile   Result Value Ref Range    Bilirubin, Total 0.5 0.0 - 1.0 mg/dL    Bilirubin, Direct 0.2 <=0.5 mg/dL    Protein, Total 8.7 (H) 6.0 - 8.0 g/dL    Albumin 4.1 3.5 - 5.0 g/dL    Alkaline Phosphatase 91 45 - 120 U/L    AST 26 0 - 40 U/L    ALT 30 0 - 45 U/L   Urinalysis-UC if Indicated   Result Value Ref Range    Color, UA Yellow Colorless, Yellow, Straw, Light Yellow    Clarity, UA Clear Clear    Glucose, UA Negative Negative    Bilirubin, UA Negative Negative    Ketones, UA Negative  Negative    Specific Gravity, UA 1.010 1.005 - 1.030    Blood, UA Trace (!) Negative    pH, UA 6.0 5.0 - 8.0    Protein, UA Negative Negative mg/dL    Urobilinogen, UA 0.2 E.U./dL 0.2 E.U./dL, 1.0 E.U./dL    Nitrite, UA Negative Negative    Leukocytes, UA Negative Negative    Bacteria, UA None Seen None Seen hpf    RBC, UA 0-2 None Seen, 0-2 hpf    WBC, UA None Seen None Seen, 0-5 hpf    Squam Epithel, UA 0-5 None Seen, 0-5 lpf    Narrative    UC not indicated       Increased lipids. I like to see you for this to start lipid lowering treatment. Please see me again.    Rest of your labs are normal, good!    Thanks.    Please call with questions or contact us using [employer].    Sincerely,        Electronically signed by Jakub Andrea MD        no

## 2021-06-20 NOTE — PROGRESS NOTES
Office Visit - Follow Up   Bria Moore   52 y.o. female    Date of Visit: 8/24/2018    Chief Complaint   Patient presents with     Follow-up        Assessment and Plan   1. Essential hypertension  Controlled.  Continue lisinopril.    Reviewed and discussed her colonoscopy result.      Follow up in 4 months.     History of Present Illness   This 52 y.o. old female is here for follow-up.  Only has hypertension controlled by lisinopril.  Does not have any complaints.  Had screening colonoscopy on 8/9/2018 which was basically normal but there was yellowish thick adherent stool in one area of the colo which was not well visualized.  Was advised to have repeat colonoscopy in 3 years.  Does not have complaints..  Feels well, otherwise.    Review of Systems   A 12 point comprehensive review of systems was negative except as noted..     Medications, Allergies and Problem List   Reviewed and updated             Chief Complaint   Follow-up       Patient Profile   Social History     Social History Narrative        Past Medical History   Patient Active Problem List   Diagnosis     Back pain     Inguinal pain, right     Essential hypertension       Past Surgical History  She has no past surgical history on file.       Medications and Allergies   Current Outpatient Prescriptions   Medication Sig     lisinopril (PRINIVIL,ZESTRIL) 10 MG tablet Take 1 tablet (10 mg total) by mouth daily.     No Known Allergies     Family and Social History   No family history on file.     Social History   Substance Use Topics     Smoking status: Never Smoker     Smokeless tobacco: Never Used     Alcohol use None        Physical Exam       Physical Exam  /80  Pulse 68  Ht 5' (1.524 m)  Wt 125 lb (56.7 kg)  BMI 24.41 kg/m2  General appearance: alert, appears stated age, cooperative and no distress  Head: Normocephalic, without obvious abnormality, atraumatic  Throat: lips, mucosa, and tongue normal; teeth and gums normal  Neck: no  adenopathy, no carotid bruit, no JVD, supple, symmetrical, trachea midline and thyroid not enlarged, symmetric, no tenderness/mass/nodules  Lungs: clear to auscultation bilaterally  Heart: regular rate and rhythm, S1, S2 normal, no murmur, click, rub or gallop  Abdomen: soft, non-tender; bowel sounds normal; no masses,  no organomegaly  Extremities: extremities normal, atraumatic, no cyanosis or edema  Skin: Skin color, texture, turgor normal. No rashes or lesions     Additional Information        Jakub Andrea MD  Internal Medicine  Contact me at [phone]     Additional Information   Current Outpatient Prescriptions   Medication Sig     lisinopril (PRINIVIL,ZESTRIL) 10 MG tablet Take 1 tablet (10 mg total) by mouth daily.     No Known Allergies  Social History   Substance Use Topics     Smoking status: Never Smoker     Smokeless tobacco: Never Used     Alcohol use None         Time: total time spent with the patient was 25 minutes of which >50% was spent in counseling and coordination of care

## 2021-06-21 NOTE — LETTER
Letter by Jakub Andrea MD at      Author: Jakub Andrea MD Service: -- Author Type: --    Filed:  Encounter Date: 2/2/2021 Status: (Other)         Bria Moore  [address] [place] [address]             February 2, 2021         Dear Ms. Moore,    Below are the results from your recent visit:    Resulted Orders   HM2(CBC w/o Differential)   Result Value Ref Range    WBC 4.4 4.0 - 11.0 thou/uL    RBC 3.89 3.80 - 5.40 mill/uL    Hemoglobin 13.3 12.0 - 16.0 g/dL    Hematocrit 38.6 35.0 - 47.0 %    MCV 99 80 - 100 fL    MCH 34.2 (H) 27.0 - 34.0 pg    MCHC 34.5 32.0 - 36.0 g/dL    RDW 11.3 11.0 - 14.5 %    Platelets 360 140 - 440 thou/uL    MPV 9.3 7.0 - 10.0 fL   Lipid Cascade   Result Value Ref Range    Cholesterol 154 <=199 mg/dL    Triglycerides 106 <=149 mg/dL    HDL Cholesterol 54 >=50 mg/dL    LDL Calculated 79 <=129 mg/dL    Patient Fasting > 8hrs? Yes    Basic Metabolic Panel   Result Value Ref Range    Sodium 140 136 - 145 mmol/L    Potassium 4.5 3.5 - 5.0 mmol/L    Chloride 108 (H) 98 - 107 mmol/L    CO2 23 22 - 31 mmol/L    Anion Gap, Calculation 9 5 - 18 mmol/L    Glucose 98 70 - 125 mg/dL    Calcium 9.6 8.5 - 10.5 mg/dL    BUN 9 8 - 22 mg/dL    Creatinine 0.70 0.60 - 1.10 mg/dL    GFR MDRD Af Amer >60 >60 mL/min/1.73m2    GFR MDRD Non Af Amer >60 >60 mL/min/1.73m2    Narrative    Fasting Glucose reference range is 70-99 mg/dL per  American Diabetes Association (ADA) guidelines.   Hepatic Profile   Result Value Ref Range    Bilirubin, Total 0.9 0.0 - 1.0 mg/dL    Bilirubin, Direct 0.3 <=0.5 mg/dL    Protein, Total 8.2 (H) 6.0 - 8.0 g/dL    Albumin 4.4 3.5 - 5.0 g/dL    Alkaline Phosphatase 94 45 - 120 U/L    AST 23 0 - 40 U/L    ALT 29 0 - 45 U/L       Normal all labs specifically your lipids. Good! Some insignificant changes of no clinical importance. Continue same medications. Simvastatin is now working to decrease your lipids. Thanks.     Please call with questions or contact us using  MyChart.    Sincerely,        Electronically signed by Jakub Andrea MD

## 2021-06-21 NOTE — LETTER
Letter by Jakub Andrea MD at      Author: Jakub Andrea MD Service: -- Author Type: --    Filed:  Encounter Date: 12/21/2020 Status: (Other)         Bria Moore  [address] [place] [address]             December 21, 2020         Dear Ms. Moore,    Below are the results from your recent visit:    Resulted Orders   HM2(CBC w/o Differential)   Result Value Ref Range    WBC 4.5 4.0 - 11.0 thou/uL    RBC 3.83 3.80 - 5.40 mill/uL    Hemoglobin 13.3 12.0 - 16.0 g/dL    Hematocrit 39.2 35.0 - 47.0 %     (H) 80 - 100 fL    MCH 34.8 (H) 27.0 - 34.0 pg    MCHC 34.0 32.0 - 36.0 g/dL    RDW 10.3 (L) 11.0 - 14.5 %    Platelets 317 140 - 440 thou/uL    MPV 7.4 7.0 - 10.0 fL   Lipid Cascade   Result Value Ref Range    Cholesterol 192 <=199 mg/dL    Triglycerides 122 <=149 mg/dL    HDL Cholesterol 64 >=50 mg/dL    LDL Calculated 104 <=129 mg/dL    Patient Fasting > 8hrs? Yes    Basic Metabolic Panel   Result Value Ref Range    Sodium 141 136 - 145 mmol/L    Potassium 4.1 3.5 - 5.0 mmol/L    Chloride 106 98 - 107 mmol/L    CO2 25 22 - 31 mmol/L    Anion Gap, Calculation 10 5 - 18 mmol/L    Glucose 97 70 - 125 mg/dL    Calcium 9.4 8.5 - 10.5 mg/dL    BUN 11 8 - 22 mg/dL    Creatinine 0.69 0.60 - 1.10 mg/dL    GFR MDRD Af Amer >60 >60 mL/min/1.73m2    GFR MDRD Non Af Amer >60 >60 mL/min/1.73m2    Narrative    Fasting Glucose reference range is 70-99 mg/dL per  American Diabetes Association (ADA) guidelines.   Hepatic Profile   Result Value Ref Range    Bilirubin, Total 0.6 0.0 - 1.0 mg/dL    Bilirubin, Direct 0.2 <=0.5 mg/dL    Protein, Total 8.0 6.0 - 8.0 g/dL    Albumin 4.3 3.5 - 5.0 g/dL    Alkaline Phosphatase 96 45 - 120 U/L    AST 28 0 - 40 U/L    ALT 25 0 - 45 U/L       Now normal lipids with simvastatin. Good!      Normal all other labs with some insignificant changes of no clinical importance.     Continue all medications. Thanks.     Please call with questions or contact us using  MyChart.    Sincerely,        Electronically signed by Jakub Andrea MD

## 2021-06-21 NOTE — LETTER
Letter by Jakub Andrea MD at      Author: Jakub Andrea MD Service: -- Author Type: --    Filed:  Encounter Date: 11/24/2020 Status: (Other)         Bria Moore  [address] [place] [address]             November 24, 2020         Dear Ms. Moore,    Below are the results from your recent visit:    Resulted Orders   HM2(CBC w/o Differential)   Result Value Ref Range    WBC 4.4 4.0 - 11.0 thou/uL    RBC 3.75 (L) 3.80 - 5.40 mill/uL    Hemoglobin 13.0 12.0 - 16.0 g/dL    Hematocrit 38.6 35.0 - 47.0 %     (H) 80 - 100 fL    MCH 34.7 (H) 27.0 - 34.0 pg    MCHC 33.7 32.0 - 36.0 g/dL    RDW 11.9 11.0 - 14.5 %    Platelets 313 140 - 440 thou/uL    MPV 10.6 8.5 - 12.5 fL   Lipid Cascade   Result Value Ref Range    Cholesterol 243 (H) <=199 mg/dL    Triglycerides 145 <=149 mg/dL    HDL Cholesterol 63 >=50 mg/dL    LDL Calculated 151 (H) <=129 mg/dL    Patient Fasting > 8hrs? Yes    Thyroid Stimulating Hormone (TSH)   Result Value Ref Range    TSH 1.45 0.30 - 5.00 uIU/mL   Urinalysis-UC if Indicated   Result Value Ref Range    Color, UA Yellow Colorless, Yellow, Straw, Light Yellow    Clarity, UA Clear Clear    Glucose, UA Negative Negative    Bilirubin, UA Negative Negative    Ketones, UA Negative Negative    Specific Gravity, UA 1.015 1.005 - 1.030    Blood, UA Trace (!) Negative    pH, UA 7.0 5.0 - 8.0    Protein, UA Negative Negative mg/dL    Urobilinogen, UA 0.2 E.U./dL 0.2 E.U./dL, 1.0 E.U./dL    Nitrite, UA Negative Negative    Leukocytes, UA Negative Negative    Bacteria, UA None Seen None Seen hpf    RBC, UA 0-2 None Seen, 0-2 hpf    WBC, UA None Seen None Seen, 0-5 hpf    Squam Epithel, UA 0-5 None Seen, 0-5 lpf    Narrative    UC not indicated   Basic Metabolic Panel   Result Value Ref Range    Sodium 138 136 - 145 mmol/L    Potassium 4.1 3.5 - 5.0 mmol/L    Chloride 104 98 - 107 mmol/L    CO2 26 22 - 31 mmol/L    Anion Gap, Calculation 8 5 - 18 mmol/L    Glucose 101 70 - 125 mg/dL     Calcium 9.7 8.5 - 10.5 mg/dL    BUN 11 8 - 22 mg/dL    Creatinine 0.70 0.60 - 1.10 mg/dL    GFR MDRD Af Amer >60 >60 mL/min/1.73m2    GFR MDRD Non Af Amer >60 >60 mL/min/1.73m2    Narrative    Fasting Glucose reference range is 70-99 mg/dL per  American Diabetes Association (ADA) guidelines.   Hepatic Profile   Result Value Ref Range    Bilirubin, Total 0.6 0.0 - 1.0 mg/dL    Bilirubin, Direct 0.2 <=0.5 mg/dL    Protein, Total 8.1 (H) 6.0 - 8.0 g/dL    Albumin 4.4 3.5 - 5.0 g/dL    Alkaline Phosphatase 100 45 - 120 U/L    AST 27 0 - 40 U/L    ALT 27 0 - 45 U/L   Vitamin D, Total (25-Hydroxy)   Result Value Ref Range    Vitamin D, Total (25-Hydroxy) 21.1 (L) 30.0 - 80.0 ng/mL    Narrative    Deficiency <10.0 ng/mL  Insufficiency 10.0-29.9 ng/mL  Sufficiency 30.0-80.0 ng/mL  Toxicity (possible) >100.0 ng/mL       You have low vitamin D. Hence you need to take over the counter vitamin D supplement 2000 units daily.   You still have increased lipids. Hence, you need treatment for this now. Will do virtual visit by phone to discuss your treatment   Rest of your labs are normal. Thanks.     Please call with questions or contact us using [employer].    Sincerely,        Electronically signed by Jakub Andrea MD

## 2021-06-23 NOTE — PROGRESS NOTES
Office Visit - Follow Up   Bria Moore   53 y.o. female    Date of Visit: 1/21/2019    Chief Complaint   Patient presents with     Hypertension     Mass     Neck. Concerned about thyroid issues     Abdominal Pain     Empty feeling after eating        Assessment and Plan   1. Essential hypertension  At ideally controlled.  Blood pressure runs  at 160/90 even a little bit higher at home.  Only takes lisinopril 10 mg daily.  Will increase it to 20 mg daily.  Still has a lot of 10 mg lisinopril.  Advised to take 2 tablets of lisinopril daily.  Will prescribe new dose of lisinopril 20 mg daily.  Will follow her up in 3 months for blood pressure.  Check CBC and basic metabolic panel.  - lisinopril (PRINIVIL,ZESTRIL) 20 MG tablet; Take 1 tablet (20 mg total) by mouth daily.  Dispense: 90 tablet; Refill: 3  - HM2(CBC w/o Differential)  - Basic Metabolic Panel    2. Pure hypercholesterolemia  Has mild pure hypercholesterolemia.  Not on medication yet.  Check fasting lipids TSH and liver function.    - Lipid Cascade  - Thyroid Stimulating Hormone (TSH)  - Hepatic Profile    3. Acid peptic disease  Complains of mild epigastric discomfort off and on.  Usually noted after eating.  Even after eating she still experiences hunger pangs.  Will try omeprazole.  Will see her in 3 months.  - omeprazole (PRILOSEC) 20 MG capsule; Take 1 capsule (20 mg total) by mouth daily.  Dispense: 90 capsule; Refill: 3    4. Need for prophylactic vaccination and inoculation against influenza  Flu shot was given.  - Influenza, Recombinant, Inj, Quadrivalent, PF, 18+YRS      Follow up in 3 months.     History of Present Illness   This 53 y.o. old female is here for follow-up.  Has hypertension.  Not ideally controlled by lisinopril.  Checks her blood pressure at home and it runs in the 160-190.  Needs increased dose of her lisinopril.  Has pure hypercholesterolemia.  Not on treatment.  Will check her fasting lipids.  Will see if she needs treatment.   Complains of epigastric discomfort or distress after eating.  Feels some hunger pains even after eating.  Wants treatment.  Otherwise, she is doing well.  Due for her flu shot.    Review of Systems   A 12 point comprehensive review of systems was negative except as noted..     Medications, Allergies and Problem List   Reviewed and updated             Chief Complaint   Hypertension; Mass (Neck. Concerned about thyroid issues); and Abdominal Pain (Empty feeling after eating)       Patient Profile   Social History     Social History Narrative     Not on file        Past Medical History   Patient Active Problem List   Diagnosis     Back pain     Inguinal pain, right     Essential hypertension     Pure hypercholesterolemia       Past Surgical History  She has no past surgical history on file.       Medications and Allergies   Current Outpatient Medications   Medication Sig     lisinopril (PRINIVIL,ZESTRIL) 20 MG tablet Take 1 tablet (20 mg total) by mouth daily.     omeprazole (PRILOSEC) 20 MG capsule Take 1 capsule (20 mg total) by mouth daily.     No Known Allergies     Family and Social History   No family history on file.     Social History     Tobacco Use     Smoking status: Never Smoker     Smokeless tobacco: Never Used   Substance Use Topics     Alcohol use: Not on file     Drug use: Not on file        Physical Exam       Physical Exam  /88 (Patient Site: Right Arm, Patient Position: Sitting, Cuff Size: Adult Regular)   Pulse 72   Ht 5' (1.524 m)   Wt 121 lb 1.9 oz (54.9 kg)   SpO2 100%   Breastfeeding? No   BMI 23.65 kg/m    General appearance: alert, appears stated age, cooperative and no distress  Head: Normocephalic, without obvious abnormality, atraumatic  Throat: lips, mucosa, and tongue normal; teeth and gums normal  Neck: no adenopathy, no carotid bruit, no JVD, supple, symmetrical, trachea midline and thyroid not enlarged, symmetric, no tenderness/mass/nodules  Lungs: clear to auscultation  bilaterally  Heart: regular rate and rhythm, S1, S2 normal, no murmur, click, rub or gallop  Abdomen: soft, non-tender; bowel sounds normal; no masses,  no organomegaly  Extremities: extremities normal, atraumatic, no cyanosis or edema  Skin: Skin color, texture, turgor normal. No rashes or lesions     Additional Information        Jakub Andrea MD  Internal Medicine  Contact me at [phone]     Additional Information   Current Outpatient Medications   Medication Sig     lisinopril (PRINIVIL,ZESTRIL) 20 MG tablet Take 1 tablet (20 mg total) by mouth daily.     omeprazole (PRILOSEC) 20 MG capsule Take 1 capsule (20 mg total) by mouth daily.     No Known Allergies  Social History     Tobacco Use     Smoking status: Never Smoker     Smokeless tobacco: Never Used   Substance Use Topics     Alcohol use: Not on file     Drug use: Not on file         Time: total time spent with the patient was 25 minutes of which >50% was spent in counseling and coordination of care

## 2021-06-25 NOTE — TELEPHONE ENCOUNTER
Refill Approved    Rx renewed per Medication Renewal Policy. Medication was last renewed on 1/21/19 .    Sharon Guerra, [facility] Connection Triage/Med Refill 3/17/2019     Requested Prescriptions   Pending Prescriptions Disp Refills     lisinopril (PRINIVIL,ZESTRIL) 10 MG tablet [Pharmacy Med Name: LISINOPRIL 10 MG TABLET] 90 tablet 3     Sig: TAKE 1 TABLET BY MOUTH EVERY DAY    [facility] Inhibitors Refill Protocol Passed - 3/17/2019  8:20 AM       Passed - PCP or prescribing provider visit in past 12 months      Last office visit with prescriber/PCP: 1/21/2019 Jakub Andrea MD OR same dept: 1/21/2019 Jakub Andrea MD OR same specialty: 1/21/2019 Jakub Andrea MD  Last physical: Visit date not found Last MTM visit: Visit date not found   Next visit within 3 mo: Visit date not found  Next physical within 3 mo: Visit date not found  Prescriber OR PCP: Jakub Andrea MD  Last diagnosis associated with med order: 1. Essential hypertension  - lisinopril (PRINIVIL,ZESTRIL) 10 MG tablet [Pharmacy Med Name: LISINOPRIL 10 MG TABLET]; TAKE 1 TABLET BY MOUTH EVERY DAY  Dispense: 90 tablet; Refill: 3    If protocol passes may refill for 12 months if within 3 months of last provider visit (or a total of 15 months).            Passed - Serum Potassium in past 12 months    Lab Results   Component Value Date    Potassium 4.1 01/21/2019            Passed - Blood pressure filed in past 12 months    BP Readings from Last 1 Encounters:   01/21/19 160/88            Passed - Serum Creatinine in past 12 months    Creatinine   Date Value Ref Range Status   01/21/2019 0.71 0.60 - 1.10 mg/dL Final

## 2021-07-01 ENCOUNTER — OFFICE VISIT - HEALTHEAST (OUTPATIENT)
Dept: INTERNAL MEDICINE | Facility: CLINIC | Age: 56
End: 2021-07-01

## 2021-07-01 DIAGNOSIS — E55.9 VITAMIN D DEFICIENCY: ICD-10-CM

## 2021-07-01 DIAGNOSIS — I10 ESSENTIAL HYPERTENSION: ICD-10-CM

## 2021-07-01 DIAGNOSIS — M70.61 TROCHANTERIC BURSITIS OF RIGHT HIP: ICD-10-CM

## 2021-07-01 DIAGNOSIS — E78.2 MIXED HYPERLIPIDEMIA: ICD-10-CM

## 2021-07-01 LAB
ALBUMIN SERPL-MCNC: 4.6 G/DL (ref 3.5–5)
ALP SERPL-CCNC: 95 U/L (ref 45–120)
ALT SERPL W P-5'-P-CCNC: 33 U/L (ref 0–45)
ANION GAP SERPL CALCULATED.3IONS-SCNC: 12 MMOL/L (ref 5–18)
AST SERPL W P-5'-P-CCNC: 25 U/L (ref 0–40)
BILIRUB DIRECT SERPL-MCNC: 0.2 MG/DL
BILIRUB SERPL-MCNC: 0.7 MG/DL (ref 0–1)
BUN SERPL-MCNC: 12 MG/DL (ref 8–22)
CALCIUM SERPL-MCNC: 9.8 MG/DL (ref 8.5–10.5)
CHLORIDE BLD-SCNC: 105 MMOL/L (ref 98–107)
CHOLEST SERPL-MCNC: 190 MG/DL
CO2 SERPL-SCNC: 23 MMOL/L (ref 22–31)
CREAT SERPL-MCNC: 0.7 MG/DL (ref 0.6–1.1)
ERYTHROCYTE [DISTWIDTH] IN BLOOD BY AUTOMATED COUNT: 11.4 % (ref 11–14.5)
FASTING STATUS PATIENT QL REPORTED: YES
GFR SERPL CREATININE-BSD FRML MDRD: >60 ML/MIN/1.73M2
GLUCOSE BLD-MCNC: 110 MG/DL (ref 70–125)
HCT VFR BLD AUTO: 38.6 % (ref 35–47)
HDLC SERPL-MCNC: 64 MG/DL
HGB BLD-MCNC: 13.2 G/DL (ref 12–16)
LDLC SERPL CALC-MCNC: 101 MG/DL
MCH RBC QN AUTO: 34 PG (ref 27–34)
MCHC RBC AUTO-ENTMCNC: 34.2 G/DL (ref 32–36)
MCV RBC AUTO: 100 FL (ref 80–100)
PLATELET # BLD AUTO: 320 THOU/UL (ref 140–440)
PMV BLD AUTO: 9.4 FL (ref 7–10)
POTASSIUM BLD-SCNC: 4.2 MMOL/L (ref 3.5–5)
PROT SERPL-MCNC: 8.4 G/DL (ref 6–8)
RBC # BLD AUTO: 3.88 MILL/UL (ref 3.8–5.4)
SODIUM SERPL-SCNC: 140 MMOL/L (ref 136–145)
TRIGL SERPL-MCNC: 126 MG/DL
TSH SERPL DL<=0.005 MIU/L-ACNC: 1.81 UIU/ML (ref 0.3–5)
WBC: 4.7 THOU/UL (ref 4–11)

## 2021-07-01 RX ORDER — LISINOPRIL 20 MG/1
20 TABLET ORAL DAILY
Status: SHIPPED
Start: 2021-07-01 | End: 2021-07-31

## 2021-07-01 RX ORDER — AMLODIPINE BESYLATE 10 MG/1
10 TABLET ORAL DAILY
Qty: 90 TABLET | Refills: 3 | Status: SHIPPED | OUTPATIENT
Start: 2021-07-01 | End: 2022-11-18

## 2021-07-01 ASSESSMENT — MIFFLIN-ST. JEOR: SCORE: 1092.93

## 2021-07-02 ENCOUNTER — COMMUNICATION - HEALTHEAST (OUTPATIENT)
Dept: INTERNAL MEDICINE | Facility: CLINIC | Age: 56
End: 2021-07-02

## 2021-07-02 LAB — 25(OH)D3 SERPL-MCNC: 33.5 NG/ML (ref 30–80)

## 2021-07-06 VITALS
HEIGHT: 60 IN | WEIGHT: 124 LBS | DIASTOLIC BLOOD PRESSURE: 82 MMHG | HEART RATE: 82 BPM | BODY MASS INDEX: 24.35 KG/M2 | OXYGEN SATURATION: 99 % | SYSTOLIC BLOOD PRESSURE: 122 MMHG

## 2021-07-14 PROBLEM — E78.00 PURE HYPERCHOLESTEROLEMIA: Status: RESOLVED | Noted: 2019-01-21 | Resolved: 2021-01-28

## 2021-07-22 NOTE — LETTER
Letter by Jakub Andrea MD at      Author: Jakub Andrea MD Service: -- Author Type: --    Filed:  Encounter Date: 7/2/2021 Status: (Other)         Bria Moore  [address] [place] [address]             July 2, 2021         Dear Ms. Moore,    Below are the results from your recent visit:    Resulted Orders   HM2(CBC w/o Differential)   Result Value Ref Range    WBC 4.7 4.0 - 11.0 thou/uL    RBC 3.88 3.80 - 5.40 mill/uL    Hemoglobin 13.2 12.0 - 16.0 g/dL    Hematocrit 38.6 35.0 - 47.0 %     80 - 100 fL    MCH 34.0 27.0 - 34.0 pg    MCHC 34.2 32.0 - 36.0 g/dL    RDW 11.4 11.0 - 14.5 %    Platelets 320 140 - 440 thou/uL    MPV 9.4 7.0 - 10.0 fL   Lipid Cascade   Result Value Ref Range    Cholesterol 190 <=199 mg/dL    Triglycerides 126 <=149 mg/dL    HDL Cholesterol 64 >=50 mg/dL    LDL Calculated 101 <=129 mg/dL    Patient Fasting > 8hrs? Yes    Thyroid Stimulating Hormone (TSH)   Result Value Ref Range    TSH 1.81 0.30 - 5.00 uIU/mL   Basic Metabolic Panel   Result Value Ref Range    Sodium 140 136 - 145 mmol/L    Potassium 4.2 3.5 - 5.0 mmol/L    Chloride 105 98 - 107 mmol/L    CO2 23 22 - 31 mmol/L    Anion Gap, Calculation 12 5 - 18 mmol/L    Glucose 110 70 - 125 mg/dL    Calcium 9.8 8.5 - 10.5 mg/dL    BUN 12 8 - 22 mg/dL    Creatinine 0.70 0.60 - 1.10 mg/dL    GFR MDRD Af Amer >60 >60 mL/min/1.73m2    GFR MDRD Non Af Amer >60 >60 mL/min/1.73m2    Narrative    Fasting Glucose reference range is 70-99 mg/dL per  American Diabetes Association (ADA) guidelines.   Hepatic Profile   Result Value Ref Range    Bilirubin, Total 0.7 0.0 - 1.0 mg/dL    Bilirubin, Direct 0.2 <=0.5 mg/dL    Protein, Total 8.4 (H) 6.0 - 8.0 g/dL    Albumin 4.6 3.5 - 5.0 g/dL    Alkaline Phosphatase 95 45 - 120 U/L    AST 25 0 - 40 U/L    ALT 33 0 - 45 U/L       Hi Bria,    All your labs are normal especially your lipids. Good labs! Continue same medications.     I like to thank you for seeing me through all these  years. It is an honor and privilege taking care of you. Ratna goodwin. Nai george.     Please call with questions or contact us using [employer].    Sincerely,        Electronically signed by Jakub Andrea MD

## 2021-07-22 NOTE — LETTER
Letter by Wilmar Arguello MD at      Author: Wilmar Arguello MD Service: -- Author Type: --    Filed:  Encounter Date: 7/2/2021 Status: (Other)         Bria Moore  [address] [place] [address]             July 2, 2021         Dear Ms. Moore,    Below are the results from your recent visit:    Resulted Orders   HM2(CBC w/o Differential)   Result Value Ref Range    WBC 4.7 4.0 - 11.0 thou/uL    RBC 3.88 3.80 - 5.40 mill/uL    Hemoglobin 13.2 12.0 - 16.0 g/dL    Hematocrit 38.6 35.0 - 47.0 %     80 - 100 fL    MCH 34.0 27.0 - 34.0 pg    MCHC 34.2 32.0 - 36.0 g/dL    RDW 11.4 11.0 - 14.5 %    Platelets 320 140 - 440 thou/uL    MPV 9.4 7.0 - 10.0 fL   Lipid Cascade   Result Value Ref Range    Cholesterol 190 <=199 mg/dL    Triglycerides 126 <=149 mg/dL    HDL Cholesterol 64 >=50 mg/dL    LDL Calculated 101 <=129 mg/dL    Patient Fasting > 8hrs? Yes    Thyroid Stimulating Hormone (TSH)   Result Value Ref Range    TSH 1.81 0.30 - 5.00 uIU/mL   Basic Metabolic Panel   Result Value Ref Range    Sodium 140 136 - 145 mmol/L    Potassium 4.2 3.5 - 5.0 mmol/L    Chloride 105 98 - 107 mmol/L    CO2 23 22 - 31 mmol/L    Anion Gap, Calculation 12 5 - 18 mmol/L    Glucose 110 70 - 125 mg/dL    Calcium 9.8 8.5 - 10.5 mg/dL    BUN 12 8 - 22 mg/dL    Creatinine 0.70 0.60 - 1.10 mg/dL    GFR MDRD Af Amer >60 >60 mL/min/1.73m2    GFR MDRD Non Af Amer >60 >60 mL/min/1.73m2    Narrative    Fasting Glucose reference range is 70-99 mg/dL per  American Diabetes Association (ADA) guidelines.   Hepatic Profile   Result Value Ref Range    Bilirubin, Total 0.7 0.0 - 1.0 mg/dL    Bilirubin, Direct 0.2 <=0.5 mg/dL    Protein, Total 8.4 (H) 6.0 - 8.0 g/dL    Albumin 4.6 3.5 - 5.0 g/dL    Alkaline Phosphatase 95 45 - 120 U/L    AST 25 0 - 40 U/L    ALT 33 0 - 45 U/L   Vitamin D, Total (25-Hydroxy)   Result Value Ref Range    Vitamin D, Total (25-Hydroxy) 33.5 30.0 - 80.0 ng/mL    Narrative    Deficiency <10.0  ng/mL  Insufficiency 10.0-29.9 ng/mL  Sufficiency 30.0-80.0 ng/mL  Toxicity (possible) >100.0 ng/mL       All very good results.    Please call with questions or contact us using [employer].    Sincerely,        Electronically signed by Wilmar Arguello MD

## 2021-07-31 DIAGNOSIS — I10 ESSENTIAL HYPERTENSION: ICD-10-CM

## 2021-07-31 RX ORDER — LISINOPRIL 20 MG/1
TABLET ORAL
Qty: 90 TABLET | Refills: 3 | Status: SHIPPED | OUTPATIENT
Start: 2021-07-31 | End: 2022-10-12

## 2021-07-31 NOTE — TELEPHONE ENCOUNTER
"Rx able to be refilled per RN [facility] refill protocol.      ________________________________________________________________    Copy of Last Rx:        Last office visit with provider:  7/1/21   ________________________________________________________________        Requested Prescriptions   Pending Prescriptions Disp Refills     lisinopril (ZESTRIL) 20 MG tablet [Pharmacy Med Name: LISINOPRIL 20 MG TABLET] 90 tablet 3     Sig: TAKE 1 TABLET BY MOUTH EVERY DAY       ACE Inhibitors (Including Combos) Protocol Passed - 7/31/2021  7:28 AM        Passed - Blood pressure under 140/90 in past 12 months     BP Readings from Last 3 Encounters:   07/01/21 122/82   03/11/21 (!) 158/84   01/28/21 (!) 150/88                 Passed - Recent (12 mo) or future (30 days) visit within the authorizing provider's specialty     Patient has had an office visit with the authorizing provider or a provider within the authorizing providers department within the previous 12 mos or has a future within next 30 days. See \"Patient Info\" tab in inbasket, or \"Choose Columns\" in Meds & Orders section of the refill encounter.              Passed - Medication is active on med list        Passed - Patient is age 18 or older        Passed - No active pregnancy on record        Passed - Normal serum creatinine on file in past 12 months     Recent Labs   Lab Test 07/01/21  1029   CR 0.70       Ok to refill medication if creatinine is low          Passed - Normal serum potassium on file in past 12 months     Recent Labs   Lab Test 07/01/21  1029   POTASSIUM 4.2             Passed - No positive pregnancy test within past 12 months             Arabella Betts RN 07/31/21 2:42 PM  "

## 2021-09-24 ENCOUNTER — TRANSFERRED RECORDS (OUTPATIENT)
Dept: HEALTH INFORMATION MANAGEMENT | Facility: CLINIC | Age: 56
End: 2021-09-24

## 2021-10-09 ENCOUNTER — HEALTH MAINTENANCE LETTER (OUTPATIENT)
Age: 56
End: 2021-10-09

## 2021-12-02 DIAGNOSIS — E78.00 PURE HYPERCHOLESTEROLEMIA: ICD-10-CM

## 2021-12-04 NOTE — TELEPHONE ENCOUNTER
"Routing refill request to provider for review/approval because:  Last prescribed by Dr Andrea.    Former patient of Dr Andrea & has not established care with another provider.  Please assign refill request to covering provider per Clinic standard process.    Last Written Prescription Date:  12/01/2020  Last Fill Quantity: 90,  # refills: 3   Last office visit provider:  07/01/2021 with Dr Andrea.     Requested Prescriptions   Pending Prescriptions Disp Refills     simvastatin (ZOCOR) 10 MG tablet [Pharmacy Med Name: SIMVASTATIN 10 MG TABLET] 90 tablet 3     Sig: TAKE 1 TABLET BY MOUTH EVERYDAY AT BEDTIME       Statins Protocol Failed - 12/2/2021 12:24 AM        Failed - Recent (12 mo) or future (30 days) visit within the authorizing provider's specialty     Patient has had an office visit with the authorizing provider or a provider within the authorizing providers department within the previous 12 mos or has a future within next 30 days. See \"Patient Info\" tab in inbasket, or \"Choose Columns\" in Meds & Orders section of the refill encounter.              Passed - LDL on file in past 12 months     Recent Labs   Lab Test 07/01/21  1029                Passed - No abnormal creatine kinase in past 12 months     No lab results found.             Passed - Medication is active on med list        Passed - Patient is age 18 or older        Passed - No active pregnancy on record        Passed - No positive pregnancy test in past 12 months             Consuelo Renteria 12/03/21 9:37 PM  "

## 2021-12-10 RX ORDER — SIMVASTATIN 10 MG
TABLET ORAL
Qty: 90 TABLET | Refills: 3 | Status: SHIPPED | OUTPATIENT
Start: 2021-12-10 | End: 2022-11-18

## 2022-01-29 ENCOUNTER — HEALTH MAINTENANCE LETTER (OUTPATIENT)
Age: 57
End: 2022-01-29

## 2022-03-24 ENCOUNTER — TRANSFERRED RECORDS (OUTPATIENT)
Dept: HEALTH INFORMATION MANAGEMENT | Facility: CLINIC | Age: 57
End: 2022-03-24

## 2022-03-25 ENCOUNTER — TRANSFERRED RECORDS (OUTPATIENT)
Dept: HEALTH INFORMATION MANAGEMENT | Facility: CLINIC | Age: 57
End: 2022-03-25

## 2022-04-08 ENCOUNTER — TRANSFERRED RECORDS (OUTPATIENT)
Dept: MULTI SPECIALTY CLINIC | Facility: CLINIC | Age: 57
End: 2022-04-08

## 2022-04-08 LAB
CHOLESTEROL (EXTERNAL): 205 MG/DL (ref 100–199)
HBA1C MFR BLD: 5.9 %
HDLC SERPL-MCNC: 63 MG/DL
HPV ABSTRACT: NORMAL
LDL CHOLESTEROL CALCULATED (EXTERNAL): 109 MG/DL
NON HDL CHOLESTEROL (EXTERNAL): 142 MG/DL
PAP-ABSTRACT: NORMAL
TRIGLYCERIDES (EXTERNAL): 166 MG/DL

## 2022-05-21 ENCOUNTER — HEALTH MAINTENANCE LETTER (OUTPATIENT)
Age: 57
End: 2022-05-21

## 2022-08-15 DIAGNOSIS — I10 ESSENTIAL (PRIMARY) HYPERTENSION: ICD-10-CM

## 2022-08-15 RX ORDER — AMLODIPINE BESYLATE 10 MG/1
10 TABLET ORAL DAILY
Qty: 30 TABLET | Refills: 0 | Status: SHIPPED | OUTPATIENT
Start: 2022-08-15 | End: 2022-11-18

## 2022-08-15 NOTE — TELEPHONE ENCOUNTER
"Routing refill request to provider for review/approval because:  Patient needs to be seen because it has been more than 1 year since last office visit.    Last Written Prescription Date:  7/4/22  Last Fill Quantity: 45,  # refills: 0   Last office visit provider:  7/1/21     Requested Prescriptions   Pending Prescriptions Disp Refills     amLODIPine (NORVASC) 10 MG tablet [Pharmacy Med Name: AMLODIPINE BESYLATE 10 MG TAB] 45 tablet 0     Sig: TAKE 1 TABLET (10 MG) BY MOUTH DAILY LAST REFILL. MUST ESTABLISH CARE WITH NEW PHYSICIAN       Calcium Channel Blockers Protocol  Failed - 8/15/2022 12:38 AM        Failed - Blood pressure under 140/90 in past 12 months     BP Readings from Last 3 Encounters:   07/01/21 122/82   03/11/21 (!) 158/84   01/28/21 (!) 150/88                 Failed - Recent (12 mo) or future (30 days) visit within the authorizing provider's specialty     Patient has had an office visit with the authorizing provider or a provider within the authorizing providers department within the previous 12 mos or has a future within next 30 days. See \"Patient Info\" tab in inbasket, or \"Choose Columns\" in Meds & Orders section of the refill encounter.              Failed - Normal serum creatinine on file in past 12 months     Recent Labs   Lab Test 07/01/21  1029   CR 0.70       Ok to refill medication if creatinine is low          Passed - Medication is active on med list        Passed - Patient is age 18 or older        Passed - No active pregnancy on record        Passed - No positive pregnancy test in past 12 months             Elsie Valenzuela RN 08/15/22 6:51 PM  "

## 2022-09-11 ENCOUNTER — HEALTH MAINTENANCE LETTER (OUTPATIENT)
Age: 57
End: 2022-09-11

## 2022-10-12 DIAGNOSIS — I10 ESSENTIAL HYPERTENSION: ICD-10-CM

## 2022-10-13 RX ORDER — LISINOPRIL 20 MG/1
20 TABLET ORAL DAILY
Qty: 60 TABLET | Refills: 0 | Status: SHIPPED | OUTPATIENT
Start: 2022-10-13 | End: 2022-11-18

## 2022-10-13 NOTE — TELEPHONE ENCOUNTER
"Former patient of ??? & has not established care with another provider.  Please assign refill request to covering provider per clinic standard process.    Routing refill request to provider for review/approval because:  Labs not current:  Multiple  Patient needs to be seen because it has been more than 1 year since last office visit.  BP not current    Last Written Prescription Date:  7/31/21  Last Fill Quantity: 90,  # refills: 3   Last office visit provider:  7/1/21     Requested Prescriptions   Pending Prescriptions Disp Refills     lisinopril (ZESTRIL) 20 MG tablet 90 tablet 3     Sig: Take 1 tablet (20 mg) by mouth daily       ACE Inhibitors (Including Combos) Protocol Failed - 10/12/2022  5:04 PM        Failed - Blood pressure under 140/90 in past 12 months     BP Readings from Last 3 Encounters:   07/01/21 122/82   03/11/21 (!) 158/84   01/28/21 (!) 150/88                 Failed - Recent (12 mo) or future (30 days) visit within the authorizing provider's specialty     Patient has had an office visit with the authorizing provider or a provider within the authorizing providers department within the previous 12 mos or has a future within next 30 days. See \"Patient Info\" tab in inbasket, or \"Choose Columns\" in Meds & Orders section of the refill encounter.              Failed - Normal serum creatinine on file in past 12 months     Recent Labs   Lab Test 07/01/21  1029   CR 0.70       Ok to refill medication if creatinine is low          Failed - Normal serum potassium on file in past 12 months     Recent Labs   Lab Test 07/01/21  1029   POTASSIUM 4.2             Passed - Medication is active on med list        Passed - Patient is age 18 or older        Passed - No active pregnancy on record        Passed - No positive pregnancy test within past 12 months             Murphy Castillo RN 10/13/22 9:21 AM  "

## 2022-10-13 NOTE — TELEPHONE ENCOUNTER
Last fill 7/31/21    Last seen 7/1/21    . Essential hypertension  Controlled. Continue amlodipine 10 mg in the morning and lisinopril 20 mg in the afternoon.   - amLODIPine (NORVASC) 10 MG tablet; Take 1 tablet (10 mg total) by mouth daily.  Dispense: 90 tablet; Refill: 3  - lisinopriL (PRINIVIL,ZESTRIL) 20 MG tablet; Take 1 tablet (20 mg total) by mouth daily.  - HM2(CBC w/o Differential)  - Basic Metabolic Panel    Follow up in 4 months.     Upcoming appointment with Dr. Whalen on 11/18/22

## 2022-11-18 ENCOUNTER — OFFICE VISIT (OUTPATIENT)
Dept: FAMILY MEDICINE | Facility: CLINIC | Age: 57
End: 2022-11-18
Payer: COMMERCIAL

## 2022-11-18 VITALS
OXYGEN SATURATION: 98 % | TEMPERATURE: 98.6 F | HEIGHT: 60 IN | DIASTOLIC BLOOD PRESSURE: 90 MMHG | WEIGHT: 122.2 LBS | BODY MASS INDEX: 23.99 KG/M2 | HEART RATE: 86 BPM | RESPIRATION RATE: 20 BRPM | SYSTOLIC BLOOD PRESSURE: 182 MMHG

## 2022-11-18 DIAGNOSIS — M79.644 BILATERAL THUMB PAIN: ICD-10-CM

## 2022-11-18 DIAGNOSIS — Z00.00 ROUTINE GENERAL MEDICAL EXAMINATION AT A HEALTH CARE FACILITY: Primary | ICD-10-CM

## 2022-11-18 DIAGNOSIS — Z72.820 POOR SLEEP: ICD-10-CM

## 2022-11-18 DIAGNOSIS — I10 ESSENTIAL HYPERTENSION: ICD-10-CM

## 2022-11-18 DIAGNOSIS — M79.645 BILATERAL THUMB PAIN: ICD-10-CM

## 2022-11-18 PROCEDURE — 90682 RIV4 VACC RECOMBINANT DNA IM: CPT | Performed by: STUDENT IN AN ORGANIZED HEALTH CARE EDUCATION/TRAINING PROGRAM

## 2022-11-18 PROCEDURE — 91312 COVID-19,PF,PFIZER BOOSTER BIVALENT: CPT | Performed by: STUDENT IN AN ORGANIZED HEALTH CARE EDUCATION/TRAINING PROGRAM

## 2022-11-18 PROCEDURE — 90471 IMMUNIZATION ADMIN: CPT | Performed by: STUDENT IN AN ORGANIZED HEALTH CARE EDUCATION/TRAINING PROGRAM

## 2022-11-18 PROCEDURE — 90472 IMMUNIZATION ADMIN EACH ADD: CPT | Performed by: STUDENT IN AN ORGANIZED HEALTH CARE EDUCATION/TRAINING PROGRAM

## 2022-11-18 PROCEDURE — 90715 TDAP VACCINE 7 YRS/> IM: CPT | Performed by: STUDENT IN AN ORGANIZED HEALTH CARE EDUCATION/TRAINING PROGRAM

## 2022-11-18 PROCEDURE — 0124A COVID-19,PF,PFIZER BOOSTER BIVALENT: CPT | Performed by: STUDENT IN AN ORGANIZED HEALTH CARE EDUCATION/TRAINING PROGRAM

## 2022-11-18 PROCEDURE — 99386 PREV VISIT NEW AGE 40-64: CPT | Mod: 25 | Performed by: STUDENT IN AN ORGANIZED HEALTH CARE EDUCATION/TRAINING PROGRAM

## 2022-11-18 RX ORDER — AMLODIPINE BESYLATE 5 MG/1
5 TABLET ORAL
COMMUNITY
Start: 2021-03-11 | End: 2022-11-18

## 2022-11-18 RX ORDER — AMLODIPINE BESYLATE 10 MG/1
10 TABLET ORAL DAILY
Qty: 90 TABLET | Refills: 3 | Status: SHIPPED | OUTPATIENT
Start: 2022-11-18 | End: 2022-11-20

## 2022-11-18 RX ORDER — LISINOPRIL 40 MG/1
20 TABLET ORAL 2 TIMES DAILY
Qty: 90 TABLET | Refills: 3 | Status: SHIPPED | OUTPATIENT
Start: 2022-11-18 | End: 2024-01-29

## 2022-11-18 RX ORDER — SIMVASTATIN 10 MG
10 TABLET ORAL DAILY
Qty: 90 TABLET | Refills: 3 | Status: SHIPPED | OUTPATIENT
Start: 2022-11-18 | End: 2023-11-29

## 2022-11-18 RX ORDER — SIMVASTATIN 10 MG
10 TABLET ORAL DAILY
COMMUNITY
Start: 2020-12-01 | End: 2022-11-18

## 2022-11-18 RX ORDER — GABAPENTIN 300 MG/1
300 CAPSULE ORAL AT BEDTIME
Qty: 90 CAPSULE | Refills: 0 | Status: SHIPPED | OUTPATIENT
Start: 2022-11-18 | End: 2023-01-30

## 2022-11-18 RX ORDER — LISINOPRIL 20 MG/1
1 TABLET ORAL DAILY
COMMUNITY
Start: 2021-07-01 | End: 2022-11-20

## 2022-11-18 ASSESSMENT — ENCOUNTER SYMPTOMS
HEADACHES: 1
JOINT SWELLING: 1
CHILLS: 0
EYE PAIN: 0
SORE THROAT: 0
ARTHRALGIAS: 1
HEMATOCHEZIA: 0
NAUSEA: 0
DIARRHEA: 0
NERVOUS/ANXIOUS: 1
PALPITATIONS: 0
ABDOMINAL PAIN: 0
MYALGIAS: 1
CONSTIPATION: 0
SHORTNESS OF BREATH: 0
HEMATURIA: 0
DIZZINESS: 1
PARESTHESIAS: 0
FREQUENCY: 1
WEAKNESS: 1
COUGH: 0
DYSURIA: 1
FEVER: 0
HEARTBURN: 1

## 2022-11-18 ASSESSMENT — PAIN SCALES - GENERAL: PAINLEVEL: MILD PAIN (2)

## 2022-11-18 ASSESSMENT — PATIENT HEALTH QUESTIONNAIRE - PHQ9
SUM OF ALL RESPONSES TO PHQ QUESTIONS 1-9: 13
SUM OF ALL RESPONSES TO PHQ QUESTIONS 1-9: 13
10. IF YOU CHECKED OFF ANY PROBLEMS, HOW DIFFICULT HAVE THESE PROBLEMS MADE IT FOR YOU TO DO YOUR WORK, TAKE CARE OF THINGS AT HOME, OR GET ALONG WITH OTHER PEOPLE: SOMEWHAT DIFFICULT

## 2022-11-20 PROBLEM — M79.645 BILATERAL THUMB PAIN: Status: ACTIVE | Noted: 2022-11-20

## 2022-11-20 PROBLEM — Z72.820 POOR SLEEP: Status: ACTIVE | Noted: 2022-11-20

## 2022-11-20 PROBLEM — M79.644 BILATERAL THUMB PAIN: Status: ACTIVE | Noted: 2022-11-20

## 2022-11-20 RX ORDER — AMLODIPINE BESYLATE 10 MG/1
10 TABLET ORAL DAILY
Qty: 90 TABLET | Refills: 3 | Status: SHIPPED | OUTPATIENT
Start: 2022-11-20 | End: 2024-01-22

## 2022-11-21 NOTE — PROGRESS NOTES
SUBJECTIVE:   CC: Bria is an 56 year old who presents for preventive health visit.   Patient has been advised of split billing requirements and indicates understanding: Yes     Healthy Habits:     Getting at least 3 servings of Calcium per day:  Yes    Bi-annual eye exam:  Yes    Dental care twice a year:  NO    Sleep apnea or symptoms of sleep apnea:  Sleep apnea    Diet:  Regular (no restrictions)    Duration of exercise:  N/A    Taking medications regularly:  Yes    Medication side effects:  None    PHQ-2 Total Score: 3    Additional concerns today:  No      Today's PHQ-2 Score:   PHQ-2 (  [employer]) 2022   Q1: Little interest or pleasure in doing things 1   Q2: Feeling down, depressed or hopeless 2   PHQ-2 Score 3   Q1: Little interest or pleasure in doing things Several days   Q2: Feeling down, depressed or hopeless More than half the days   PHQ-2 Score 3       Have you ever done Advance Care Planning? (For example, a Health Directive, POLST, or a discussion with a medical provider or your loved ones about your wishes):  Didn't discuss    Social History     Tobacco Use     Smoking status: Never     Smokeless tobacco: Never   Substance Use Topics     Alcohol use: Not on file     Alcohol Use 2022   Prescreen: >3 drinks/day or >7 drinks/week? No   Prescreen: >3 drinks/day or >7 drinks/week? -     Reviewed orders with patient.  Reviewed health maintenance and updated orders accordingly - Yes  Lab work is in process  Labs reviewed in EPIC  BP Readings from Last 3 Encounters:   22 (!) 182/90   21 122/82   21 (!) 158/84    Wt Readings from Last 3 Encounters:   22 55.4 kg (122 lb 3.2 oz)   21 56.2 kg (124 lb)   21 55.3 kg (122 lb)                  Patient Active Problem List   Diagnosis     Back pain     Inguinal pain, right     Essential hypertension     Mixed hyperlipidemia     Vitamin D deficiency     Past Surgical History:   Procedure Laterality Date       SECTION  2000    2 times       Social History     Tobacco Use     Smoking status: Never     Smokeless tobacco: Never   Substance Use Topics     Alcohol use: Not on file     No family history on file.      Current Outpatient Medications   Medication Sig Dispense Refill     amLODIPine (NORVASC) 10 MG tablet Take 1 tablet (10 mg) by mouth daily 90 tablet 3     cholecalciferol, vitamin D3, 50 mcg (2,000 unit) Tab [CHOLECALCIFEROL, VITAMIN D3, 50 MCG (2,000 UNIT) TAB] Take by mouth.       gabapentin (NEURONTIN) 300 MG capsule Take 1 capsule (300 mg) by mouth At Bedtime 90 capsule 0     lisinopril (ZESTRIL) 40 MG tablet Take 0.5 tablets (20 mg) by mouth 2 times daily 90 tablet 3     simvastatin (ZOCOR) 10 MG tablet Take 1 tablet (10 mg) by mouth daily 90 tablet 3     lisinopril (ZESTRIL) 20 MG tablet Take 1 tablet by mouth daily (Patient not taking: Reported on 2022)       No Known Allergies    Breast Cancer Screening:    Breast CA Risk Assessment (FHS-7) 2022   Do you have a family history of breast, colon, or ovarian cancer? No / Unknown     Pertinent mammograms are reviewed under the imaging tab.    History of abnormal Pap smear: NO - age 30- 65 PAP every 3 years recommended     Reviewed and updated as needed this visit by clinical staff   Tobacco  Allergies  Meds              Reviewed and updated as needed this visit by Provider                 No past medical history on file.   Past Surgical History:   Procedure Laterality Date      SECTION  2000    2 times       Review of Systems  CONSTITUTIONAL: NEGATIVE for fever, chills, change in weight  INTEGUMENTARY/SKIN: NEGATIVE for worrisome rashes, moles or lesions  EYES: NEGATIVE for vision changes or irritation  ENT: NEGATIVE for ear, mouth and throat problems  RESP: NEGATIVE for significant cough or SOB  BREAST: NEGATIVE for masses, tenderness or discharge  CV: NEGATIVE for chest pain, palpitations or peripheral edema  GI: NEGATIVE for  "nausea, abdominal pain, heartburn, or change in bowel habits  : NEGATIVE for unusual urinary or vaginal symptoms. No vaginal bleeding.  MUSCULOSKELETAL: NEGATIVE for significant arthralgias or myalgia  NEURO: NEGATIVE for weakness, dizziness or paresthesias  PSYCHIATRIC: NEGATIVE for changes in mood or affect      OBJECTIVE:   BP (!) 182/90 (BP Location: Right arm, Patient Position: Sitting, Cuff Size: Adult Regular)   Pulse 86   Temp 98.6  F (37  C) (Oral)   Resp 20   Ht 1.518 m (4' 11.75\")   Wt 55.4 kg (122 lb 3.2 oz)   LMP  (LMP Unknown)   SpO2 98%   BMI 24.07 kg/m    Physical Exam  GENERAL: healthy, alert and no distress  NECK: no adenopathy, no asymmetry, masses, or scars and thyroid normal to palpation  RESP: lungs clear to auscultation - no rales, rhonchi or wheezes  BREAST: declines   CV: regular rate and rhythm, normal S1 S2, no S3 or S4, no murmur, click or rub, no peripheral edema and peripheral pulses strong  ABDOMEN: soft, nontender, no hepatosplenomegaly, no masses and bowel sounds normal  MS: no gross musculoskeletal defects noted, no edema  (+) nodules x 2 on the dip thumb joint on the right hand and nodule x 1 on the DIP of the left thumb. No overlying redness or fluctuance. (+) mild TTP and limited flexion of the DIP joint.   SKIN: no suspicious lesions or rashes  NEURO: Normal strength and tone, mentation intact and speech normal  PSYCH: mentation appears normal, affect anxious    ASSESSMENT/PLAN:       ICD-10-CM    1. Routine general medical examination at a health care facility  Z00.00 INFLUENZA QUAD, RECOMBINANT, P-FREE (RIV4) (FLUBLOK)     *MA Screening Digital Bilateral     INFLUENZA QUAD, PF (RIV4) (FLUBLOK)     COVID-19,PF,PFIZER BOOSTER BIVALENT     TDAP VACCINE (Adacel, Boostrix)  [[id number]]      2. Essential hypertension  I10 amLODIPine (NORVASC) 10 MG tablet     lisinopril (ZESTRIL) 40 MG tablet     simvastatin (ZOCOR) 10 MG tablet      3. Bilateral thumb pain  M79.644 XR " "Finger Right G/E 2 Views    M79.645 XR Finger Left G/E 2 Views      4. Poor sleep  Z72.820 gabapentin (NEURONTIN) 300 MG capsule      5. Hypertensive urgency  I16.0       6. Myalgia  M79.10       7. Positive depression screening  Z13.31       8. Subcutaneous nodule of left thumb  R22.32       9. Subcutaneous nodule of right thumb  R22.31            Patient is a pleasant 56-year-old female with PMH of HTN, HLD, vitamin D deficiency who presents today for an establish care/annual physical.     I see her  and son in my practice.     Used to see Dr. Andrea who retired over a year ago and has not been to see a new provider since     Acute concerns today: medication refills      Daughter is translating in the room.  They declined  services.     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  Eye exam/dental exam: needs dental exam   Alcohol use:no  Tobacco use/marijuana use/drug use:n  Mental health: PHQ 2 positive but notes that everything is \"okay\".  No SI/HI we will continue to monitor and discuss at the next visit.  Vaccines:Patient will check in with her insurance regarding shingles vaccine.  Okay for all the other vaccines  Blood work: Ordered    Menses/menopause: Is in menopause and denies any postmenopausal bleeding  Last Pap smear:  Per patient, got Pap smear done 2021 - "[facility] DALE signed.   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  She has developed these nodules at the DIP joint bilaterally.  They are " "painful, achy and limit her flexion.  There is no overlying redness, drainage in the area.  Have gotten larger over the last year.  Would like to know \"what they are\".  Plan: Based on history, I suspect overuse injury.  The nodules may be related to arthritis or synovial cyst.  We will start with x-ray of the thumbs and go from there.     Essential HTN:   Blood pressure is elevated 182/90.  Is taking the lisinopril but ran out of her amlodipine several months ago.  She was unable to get a refill because she had not established with a new provider.  Denies any headaches, chest pain, vision changes, increased fatigue, lower extremity edema or early satiety.  Has not checking her blood pressures at home.  Plan: Refills for blood pressure medications provided.  Stressed the importance of low-salt intake.  Patient's current blood pressure is in the range for hypertensive urgency.  We will get some updated baseline blood work to make sure that no endorgan damage has occurred.  She is currently asymptomatic.  We will see her for close follow-up in 1 month.    Poor sleep  Myalgias  Patient is a very physical job and has general myalgias and difficulty sleeping.  Was historically on Celebrex.  Is wondering if there is anything she can do to help take to go to sleep.  She works the graveyard shift and has had disordered sleep for many years.  We will trial gabapentin.    Patient has been advised of split billing requirements and indicates understanding: Yes      COUNSELING:  Reviewed preventive health counseling, as reflected in patient instructions    She reports that she has never smoked. She has never used smokeless tobacco.      Shannan Whalen,   [facility]  "

## 2022-11-27 ENCOUNTER — TELEPHONE (OUTPATIENT)
Dept: FAMILY MEDICINE | Facility: CLINIC | Age: 57
End: 2022-11-27

## 2022-11-27 DIAGNOSIS — I16.0 HYPERTENSIVE URGENCY: Primary | ICD-10-CM

## 2022-11-27 NOTE — TELEPHONE ENCOUNTER
Call patient to come in to get bloodwork done. Looks like it was not ordered at the last office visit and remind her to get imaging of her thums done.

## 2022-12-14 ENCOUNTER — HOSPITAL ENCOUNTER (OUTPATIENT)
Dept: GENERAL RADIOLOGY | Facility: HOSPITAL | Age: 57
Discharge: HOME | End: 2022-12-14
Attending: STUDENT IN AN ORGANIZED HEALTH CARE EDUCATION/TRAINING PROGRAM
Payer: COMMERCIAL

## 2022-12-14 DIAGNOSIS — M79.644 BILATERAL THUMB PAIN: ICD-10-CM

## 2022-12-14 DIAGNOSIS — M79.645 BILATERAL THUMB PAIN: ICD-10-CM

## 2022-12-14 PROCEDURE — 73140 X-RAY EXAM OF FINGER(S): CPT | Mod: RT

## 2022-12-18 DIAGNOSIS — M79.644 CHRONIC PAIN OF RIGHT THUMB: Primary | ICD-10-CM

## 2022-12-18 DIAGNOSIS — G89.29 CHRONIC PAIN OF RIGHT THUMB: Primary | ICD-10-CM

## 2022-12-18 DIAGNOSIS — M18.11 DEGENERATIVE ARTHRITIS OF THUMB, RIGHT: ICD-10-CM

## 2022-12-19 ENCOUNTER — TELEPHONE (OUTPATIENT)
Dept: FAMILY MEDICINE | Facility: CLINIC | Age: 57
End: 2022-12-19

## 2022-12-19 NOTE — TELEPHONE ENCOUNTER
Voicemail left for patient to return call to clinic- does not check Mychart.     Please relay result message below to patient.

## 2022-12-19 NOTE — LETTER
December 27, 2022      Bria KATHRYN Oscar  [address] [place] [address]        Dear ,    We are writing to inform you of your test results.    Test results indicate you may require additional follow up, see comment below.    Resulted Orders   XR Finger Right G/E 2 Views    Narrative    EXAM: XR FINGER LEFT G/E 2 VIEWS, XR FINGER RIGHT G/E 2 VIEWS  LOCATION: [facility]  DATE/TIME: 12/14/2022 8:27 AM    INDICATION: Bilateral thumb pain.  COMPARISON: None.      Impression    IMPRESSION:     Right thumb: Negative for fracture, joint malalignment, or erosion. Advanced degenerative arthrosis at the IP joint with complete joint space loss, subchondral sclerosis, and osteophytes.    Left thumb: Negative for fracture, joint malalignment, or erosion. Moderate degenerative arthrosis at the IP joint. Mild degenerative arthrosis at the CMC and MCP joints.         Right thumbs has severe, advanced arthritis and the left thumb has moderate amounts of arthritis. Current job is most likely causing arthritis flare. I would like you to see orthopedics ( bone doctor) to help explore your options .    Koby Levine would also like to see you for a blood pressure follow up.     If you have any questions or concerns, please call the clinic at the number listed above.     Shannan Whalen, DO           Sincerely,

## 2022-12-19 NOTE — TELEPHONE ENCOUNTER
----- Message from Shannan Whalen DO sent at 12/18/2022  8:56 PM CST -----  Call patient, she doesn't use MyChart     Right thumbs has severe, advanced arthritis and the left thumb has moderate amounts of arthritis. Current job is most likely causing arthritis flare. I would like you to see orthopedics ( bone doctor) to help explore your options .    Shannan Whalen DO

## 2022-12-26 NOTE — TELEPHONE ENCOUNTER
Please copy and paste original results into letter format and send it over.     Please remind patient that she needs to follow up with me for her BP, was uncontrolled last time.

## 2022-12-26 NOTE — TELEPHONE ENCOUNTER
Spoke with patient and son on results. They are asking for a letter for patient work regarding arthritis in the thumbs.

## 2022-12-27 NOTE — TELEPHONE ENCOUNTER
DIAGNOSIS: chronic pain of R thumb/Dr. Whalen/p1/ortho con   APPOINTMENT DATE: 1.5.23   NOTES STATUS DETAILS   OFFICE NOTE from referring provider Internal 12.18.22 Shannan Whalen DO  11.18.22     MEDICATION LIST Internal    XRAYS (IMAGES & REPORTS) Internal 12.14.22 R finger  12.14.22 L finger

## 2023-01-04 NOTE — PROGRESS NOTES
ASSESSMENT/PLAN:    (M18.11) Degenerative arthritis of thumb, right  (primary encounter diagnosis)  Comment:   Plan: reviewed exam/ imaging findings and tx strategy. I discssed her case with one of our hand therapists who was actually able to come into the exam room and teach taping w/ Coban and exercises; I wrote a work note requesting she be excused from using the one specific machine that requires pushing buttons with her thumbs all day long as this is the mechanism of injury; will f/u prn; We can order a guided injection if needed     (M79.644,  G89.29) Chronic pain of right thumb  Comment:   Plan: see above     Attestation:  This patient has been seen and evaluated by me, Jevon Valle MD with the resident, Dr Rock and the care team. I agree with the findings and plan of care as documented in this note.    Jevon Valle MD  January 5, 2023  10:06 AM        Pt is a 57 year old female here today for:     Bilateral thumb pain, right worse than left  Location: Bilateral thumbs   Duration: One year, has been getting progressively worse throughout the year. Worse at the end of the day, especially after a work day.   Trauma/ Fall? No   Swelling? Swollen over the right thumb   Numbness/ Tingling? Occasionally has numbness of the thumb, doesn't have numbness in other fingers   Weakness? Yes    Imaging? Yes; xray performed 12/14/22    Treatment? Takes tylenol (one pill) a few times a week.    Limitation? Work requires a lot of grasping, difficult to do with hand pain. .     X-ray 12/14/22:  IMPRESSION:      Right thumb: Negative for fracture, joint malalignment, or erosion. Advanced degenerative arthrosis at the IP joint with complete joint space loss, subchondral sclerosis, and osteophytes.     Left thumb: Negative for fracture, joint malalignment, or erosion. Moderate degenerative arthrosis at the IP joint. Mild degenerative arthrosis at the CMC and MCP joints.    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  She has developed these nodules at the DIP joint bilaterally.  They are painful, achy and limit her flexion.  There is no overlying redness, drainage in the area.  Have gotten larger over the last year.  Would like to know \"what they are\".  Plan: Based on history, I suspect overuse injury.  The nodules may be related to arthritis or synovial cyst.  We will start with x-ray of the thumbs and go from there.    No past medical history on file.   Past Surgical History:   Procedure Laterality Date      SECTION  ,     2 times      Current Outpatient Medications   Medication Sig Dispense Refill     amLODIPine (NORVASC) 10 MG tablet Take 1 tablet (10 mg) by mouth daily 90 tablet 3     cholecalciferol, vitamin D3, 50 mcg (2,000 unit) Tab [CHOLECALCIFEROL, VITAMIN D3, 50 MCG (2,000 UNIT) TAB] Take by mouth.       gabapentin (NEURONTIN) 300 MG capsule Take 1 capsule (300 mg) by mouth At Bedtime 90 capsule 0     lisinopril (ZESTRIL) 40 MG tablet Take 0.5 tablets (20 mg) by mouth 2 times daily 90 tablet 3     simvastatin (ZOCOR) 10 MG tablet Take 1 tablet (10 mg) by mouth daily 90 tablet 3      No Known Allergies   ROS:   Gen- no fevers/chills   Rheum - no morning stiffness   Derm - no rash/ redness   Neuro - no numbness, no tingling   Remainder of ROS negative.     Exam:   LMP  (LMP Unknown)        Right WRIST/HAND:   Inspection: Swelling - yes, over IP joint right thumb; Atrophy - No    Sensation: intact in median, radial, ulnar distribution   ROM:   Hand: Full flexion at PIP/DIP, finger abduction/ adduction.   Strength: 5/5 in all motions   Bony tenderness:   Hand: Metacarpals: No; Phalanges: YES - R thumb IP only   Tendons: Flexor/Extensor mechanism intact   Maneuvers:deferred        "

## 2023-01-05 ENCOUNTER — OFFICE VISIT (OUTPATIENT)
Dept: ORTHOPEDICS | Facility: CLINIC | Age: 58
End: 2023-01-05
Payer: COMMERCIAL

## 2023-01-05 ENCOUNTER — PRE VISIT (OUTPATIENT)
Dept: ORTHOPEDICS | Facility: CLINIC | Age: 58
End: 2023-01-05

## 2023-01-05 DIAGNOSIS — M18.11 DEGENERATIVE ARTHRITIS OF THUMB, RIGHT: Primary | ICD-10-CM

## 2023-01-05 DIAGNOSIS — G89.29 CHRONIC PAIN OF RIGHT THUMB: ICD-10-CM

## 2023-01-05 DIAGNOSIS — M79.644 CHRONIC PAIN OF RIGHT THUMB: ICD-10-CM

## 2023-01-05 PROCEDURE — 99203 OFFICE O/P NEW LOW 30 MIN: CPT | Mod: GC | Performed by: FAMILY MEDICINE

## 2023-01-05 NOTE — LETTER
1/5/2023      RE: Bria Moore  [address] [place] [address]     Dear Colleague,    Thank you for referring your patient, Bria Moore, to the [facility] [place]. Please see a copy of my visit note below.    ASSESSMENT/PLAN:    (M18.11) Degenerative arthritis of thumb, right  (primary encounter diagnosis)  Comment:   Plan: reviewed exam/ imaging findings and tx strategy. I discssed her case with one of our hand therapists who was actually able to come into the exam room and teach taping w/ Coban and exercises; I wrote a work note requesting she be excused from using the one specific machine that requires pushing buttons with her thumbs all day long as this is the mechanism of injury; will f/u prn; We can order a guided injection if needed     (M79.644,  G89.29) Chronic pain of right thumb  Comment:   Plan: see above     Attestation:  This patient has been seen and evaluated by me, Jevon Valle MD with the resident, Dr Rock and the care team. I agree with the findings and plan of care as documented in this note.    Jevon Valle MD  January 5, 2023  10:06 AM        Pt is a 57 year old female here today for:     Bilateral thumb pain, right worse than left  Location: Bilateral thumbs   Duration: One year, has been getting progressively worse throughout the year. Worse at the end of the day, especially after a work day.   Trauma/ Fall? No   Swelling? Swollen over the right thumb   Numbness/ Tingling? Occasionally has numbness of the thumb, doesn't have numbness in other fingers   Weakness? Yes    Imaging? Yes; xray performed 12/14/22    Treatment? Takes tylenol (one pill) a few times a week.    Limitation? Work requires a lot of grasping, difficult to do with hand pain. .     X-ray 12/14/22:  IMPRESSION:      Right thumb: Negative for fracture, joint malalignment, or erosion. Advanced degenerative arthrosis at the IP joint with complete joint space loss,  "subchondral sclerosis, and osteophytes.     Left thumb: Negative for fracture, joint malalignment, or erosion. Moderate degenerative arthrosis at the IP joint. Mild degenerative arthrosis at the CMC and MCP joints.    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  She has developed these nodules at the DIP joint bilaterally.  They are painful, achy and limit her flexion.  There is no overlying redness, drainage in the area.  Have gotten larger over the last year.  Would like to know \"what they are\".  Plan: Based on history, I suspect overuse injury.  The nodules may be related to arthritis or synovial cyst.  We will start with x-ray of the thumbs and go from there.    No past medical history on file.   Past Surgical History:   Procedure Laterality Date      SECTION  ,     2 times      Current Outpatient Medications   Medication Sig Dispense Refill     amLODIPine (NORVASC) 10 MG tablet Take 1 tablet (10 mg) by mouth daily 90 tablet 3     cholecalciferol, vitamin D3, 50 mcg (2,000 unit) Tab [CHOLECALCIFEROL, VITAMIN D3, 50 MCG (2,000 UNIT) TAB] Take by mouth.       gabapentin (NEURONTIN) 300 MG capsule Take 1 capsule (300 mg) by mouth At Bedtime 90 capsule 0     lisinopril (ZESTRIL) 40 MG tablet Take 0.5 tablets (20 mg) by mouth 2 times daily 90 tablet 3     simvastatin (ZOCOR) 10 MG tablet Take 1 tablet (10 mg) by mouth daily 90 tablet 3      No Known Allergies   ROS:   Gen- no fevers/chills   Rheum - no morning stiffness   Derm - no rash/ redness   Neuro - no numbness, no tingling   Remainder of ROS negative.     Exam:   LMP  (LMP Unknown)        Right WRIST/HAND:   Inspection: Swelling - yes, over IP joint right thumb; Atrophy - No    Sensation: intact in median, radial, ulnar distribution   ROM:   Hand: Full flexion at PIP/DIP, finger " abduction/ adduction.   Strength: 5/5 in all motions   Bony tenderness:   Hand: Metacarpals: No; Phalanges: YES - R thumb IP only   Tendons: Flexor/Extensor mechanism intact   Maneuvers:deferred      Again, thank you for allowing me to participate in the care of your patient.      Sincerely,    Jevon Valle MD

## 2023-01-05 NOTE — LETTER
"WORK MEDICAL NOTE  2023     Seen today: Yes    Patient:  Bria Moore  :   1965  MRN:     [id number]  Physician: JULIA FENTON    Bria Moore may return to work today WITH Restrictions.      The next clinic appointment is scheduled for (date/time) as needed.    Patient limitations:  Please allow Ms Moore to avoid work on the specific machine that requires using both thumbs to push buttons (\"R[employer] Tr[employer] machine\") as she has severe thumb arthritis            Julia Fenton MD  2023  9:18 AM      "

## 2023-01-16 ENCOUNTER — DOCUMENTATION ONLY (OUTPATIENT)
Dept: FAMILY MEDICINE | Facility: CLINIC | Age: 58
End: 2023-01-16
Payer: COMMERCIAL

## 2023-01-16 NOTE — PROGRESS NOTES
Type of Form Received: workability form     Form Received (Date) 1/10/23   Form Filled out Yes, date 1/16/23   Placed in provider folder Yes

## 2023-01-18 NOTE — PROGRESS NOTES
Received Completed forms Yes   Faxed Forms Email to debora at:   [email or web address]   Sent to HIM (Date) 1/17/23

## 2023-01-30 ENCOUNTER — OFFICE VISIT (OUTPATIENT)
Dept: FAMILY MEDICINE | Facility: CLINIC | Age: 58
End: 2023-01-30
Payer: COMMERCIAL

## 2023-01-30 VITALS
DIASTOLIC BLOOD PRESSURE: 84 MMHG | BODY MASS INDEX: 23.16 KG/M2 | SYSTOLIC BLOOD PRESSURE: 160 MMHG | OXYGEN SATURATION: 99 % | RESPIRATION RATE: 20 BRPM | HEART RATE: 80 BPM | WEIGHT: 118 LBS | TEMPERATURE: 97.9 F | HEIGHT: 60 IN

## 2023-01-30 DIAGNOSIS — M18.11 DEGENERATIVE ARTHRITIS OF THUMB, RIGHT: ICD-10-CM

## 2023-01-30 DIAGNOSIS — I10 ESSENTIAL HYPERTENSION: ICD-10-CM

## 2023-01-30 DIAGNOSIS — Z72.820 POOR SLEEP: Primary | ICD-10-CM

## 2023-01-30 LAB
ALBUMIN SERPL BCG-MCNC: 4.7 G/DL (ref 3.5–5.2)
ALP SERPL-CCNC: 98 U/L (ref 35–104)
ALT SERPL W P-5'-P-CCNC: 29 U/L (ref 10–35)
ANION GAP SERPL CALCULATED.3IONS-SCNC: 11 MMOL/L (ref 7–15)
AST SERPL W P-5'-P-CCNC: 35 U/L (ref 10–35)
BILIRUB SERPL-MCNC: 0.3 MG/DL
BUN SERPL-MCNC: 12.4 MG/DL (ref 6–20)
CALCIUM SERPL-MCNC: 9.8 MG/DL (ref 8.6–10)
CHLORIDE SERPL-SCNC: 104 MMOL/L (ref 98–107)
CREAT SERPL-MCNC: 0.73 MG/DL (ref 0.51–0.95)
DEPRECATED HCO3 PLAS-SCNC: 25 MMOL/L (ref 22–29)
ERYTHROCYTE [DISTWIDTH] IN BLOOD BY AUTOMATED COUNT: 11.3 % (ref 10–15)
GFR SERPL CREATININE-BSD FRML MDRD: >90 ML/MIN/1.73M2
GLUCOSE SERPL-MCNC: 102 MG/DL (ref 70–99)
HCT VFR BLD AUTO: 37.1 % (ref 35–47)
HGB BLD-MCNC: 12.7 G/DL (ref 11.7–15.7)
MCH RBC QN AUTO: 34.6 PG (ref 26.5–33)
MCHC RBC AUTO-ENTMCNC: 34.2 G/DL (ref 31.5–36.5)
MCV RBC AUTO: 101 FL (ref 78–100)
PLATELET # BLD AUTO: 292 10E3/UL (ref 150–450)
POTASSIUM SERPL-SCNC: 4 MMOL/L (ref 3.4–5.3)
PROT SERPL-MCNC: 7.9 G/DL (ref 6.4–8.3)
RBC # BLD AUTO: 3.67 10E6/UL (ref 3.8–5.2)
SODIUM SERPL-SCNC: 140 MMOL/L (ref 136–145)
WBC # BLD AUTO: 4.4 10E3/UL (ref 4–11)

## 2023-01-30 PROCEDURE — 80053 COMPREHEN METABOLIC PANEL: CPT | Performed by: STUDENT IN AN ORGANIZED HEALTH CARE EDUCATION/TRAINING PROGRAM

## 2023-01-30 PROCEDURE — 99214 OFFICE O/P EST MOD 30 MIN: CPT | Performed by: STUDENT IN AN ORGANIZED HEALTH CARE EDUCATION/TRAINING PROGRAM

## 2023-01-30 PROCEDURE — 85027 COMPLETE CBC AUTOMATED: CPT | Performed by: STUDENT IN AN ORGANIZED HEALTH CARE EDUCATION/TRAINING PROGRAM

## 2023-01-30 PROCEDURE — 36415 COLL VENOUS BLD VENIPUNCTURE: CPT | Performed by: STUDENT IN AN ORGANIZED HEALTH CARE EDUCATION/TRAINING PROGRAM

## 2023-01-30 RX ORDER — AMLODIPINE BESYLATE 10 MG/1
1 TABLET ORAL DAILY
COMMUNITY
Start: 2022-04-08 | End: 2023-01-30

## 2023-01-30 RX ORDER — MIRTAZAPINE 7.5 MG/1
7.5 TABLET, FILM COATED ORAL AT BEDTIME
Qty: 90 TABLET | Refills: 0 | Status: SHIPPED | OUTPATIENT
Start: 2023-01-30 | End: 2023-04-21

## 2023-01-30 ASSESSMENT — PAIN SCALES - GENERAL: PAINLEVEL: NO PAIN (0)

## 2023-01-30 NOTE — Clinical Note
Please abstract the following data from this visit with this patient into the appropriate field in Epic:  Tests that can be patient reported without a hard copy:  Pap smear done on this date: 4/8/2022 (approximately), by this group: [facility], results were normal.   Other Tests found in the patient's chart through Chart Review/Care Everywhere:  {Abstract Quality List (Optional):438785}  Note to Abstraction: If this section is blank, no results were found via Chart Review/Care Everywhere.

## 2023-01-30 NOTE — PROGRESS NOTES
"  Assessment & Plan   Problem List Items Addressed This Visit        Circulatory    Essential hypertension       Other    Poor sleep - Primary    Relevant Medications    mirtazapine (REMERON) 7.5 MG tablet   Other Visit Diagnoses     Degenerative arthritis of thumb, right              Hypertensive urgency  Essential hypertension  Last time she was here, blood pressure was 182/90.  Had run out of her medications medications were refilled.  Today, patient continues to be above goal at 160/84.  She says that she often forgets to take her amlodipine.  She does do lisinopril in the morning and amlodipine at nighttime.  Unclear why.  She works the [email or web address] shift and her sleep is erratic so she will often forget her meds.  She denies any chest pain, headaches, lightheadedness, dizziness or lower extremity edema.  Plan:  - Advised patient to take lisinopril and amlodipine at the same time  - We will have her come back in 1 month for CSS BP check and if still elevated, will add additional medication  - Needs updated CBC and BMP.  Future order is already in place.  - Would like her to come back and see me in 3 months    Degenerative thumb pain, right:  Went to go see Ortho on 1/5 and they wrote a letter excusing her from the machine where she has to continuously press the buttons  They also discussed doing guided injections  Today, patient notes that with changing the machine she works that, her thumb pain is much better  Pain is \"now and then\" and not too bothersome.  She is not interested in any injections at this time.    Will monitor clinically    Poor sleep/myalgias:  Was an issue last time.  She was trialed on gabapentin but she did not like it because it made her feel groggy and gave her a headache the next day.  Discussed trialing mirtazapine.  Patient is hesitant but ultimately amenable.  Reviewed side effect profile of the medication including increased depression, anxiety and possible suicidal ideation.  If " persistent symptoms, she is to discontinue the medication.    No follow-ups on file.    DO CLAU G[facility] MARIO Fraser is a 57 year old, presenting for the following health issues:  RECHECK    Her daughter is here during the visit and translating.  They declined  services.      Review of Systems   As per HPI        Objective    BP (!) 160/84 (BP Location: Right arm, Patient Position: Sitting)   Pulse 80   Temp 97.9  F (36.6  C) (Oral)   Resp 20   Ht 1.524 m (5')   Wt 53.5 kg (118 lb)   LMP  (LMP Unknown)   SpO2 99%   BMI 23.05 kg/m    Body mass index is 23.05 kg/m .  Physical Exam   GENERAL: healthy, alert and no distress  NECK: no adenopathy, no asymmetry, masses, or scars and thyroid normal to palpation  RESP: lungs clear to auscultation - no rales, rhonchi or wheezes  CV: regular rate and rhythm, normal S1 S2, no S3 or S4, no murmur, click or rub, no peripheral edema and peripheral pulses strong  MS: no gross musculoskeletal defects noted, no edema  PSYCH: mentation appears normal, affect normal

## 2023-01-30 NOTE — LETTER
February 7, 2023      Bria Moore  [address] [place] [address]        Dear ,    We are writing to inform you of your test results.    Kidney function is stable.      CBC shows no anemia.      One of the red blood cell indicators with look at, called MCV is a bit high. Can be in the setting of low B12. Will check that at the next visit.      Shannan Whalen DO    Resulted Orders   Comprehensive metabolic panel (BMP + Alb, Alk Phos, ALT, AST, Total. Bili, TP)   Result Value Ref Range    Sodium 140 136 - 145 mmol/L    Potassium 4.0 3.4 - 5.3 mmol/L    Chloride 104 98 - 107 mmol/L    Carbon Dioxide (CO2) 25 22 - 29 mmol/L    Anion Gap 11 7 - 15 mmol/L    Urea Nitrogen 12.4 6.0 - 20.0 mg/dL    Creatinine 0.73 0.51 - 0.95 mg/dL    Calcium 9.8 8.6 - 10.0 mg/dL    Glucose 102 (H) 70 - 99 mg/dL    Alkaline Phosphatase 98 35 - 104 U/L    AST 35 10 - 35 U/L    ALT 29 10 - 35 U/L    Protein Total 7.9 6.4 - 8.3 g/dL    Albumin 4.7 3.5 - 5.2 g/dL    Bilirubin Total 0.3 <=1.2 mg/dL    GFR Estimate >90 >60 mL/min/1.73m2      Comment:      eGFR calculated using 2021 CKD-EPI equation.   CBC with platelets   Result Value Ref Range    WBC Count 4.4 4.0 - 11.0 10e3/uL    RBC Count 3.67 (L) 3.80 - 5.20 10e6/uL    Hemoglobin 12.7 11.7 - 15.7 g/dL    Hematocrit 37.1 35.0 - 47.0 %     (H) 78 - 100 fL    MCH 34.6 (H) 26.5 - 33.0 pg    MCHC 34.2 31.5 - 36.5 g/dL    RDW 11.3 10.0 - 15.0 %    Platelet Count 292 150 - 450 10e3/uL       If you have any questions or concerns, please call the clinic at the number listed above.       Sincerely,      Shannan Whalen DO

## 2023-02-24 ENCOUNTER — ALLIED HEALTH/NURSE VISIT (OUTPATIENT)
Dept: FAMILY MEDICINE | Facility: CLINIC | Age: 58
End: 2023-02-24
Payer: COMMERCIAL

## 2023-02-24 VITALS — SYSTOLIC BLOOD PRESSURE: 128 MMHG | DIASTOLIC BLOOD PRESSURE: 70 MMHG

## 2023-02-24 DIAGNOSIS — I10 ESSENTIAL HYPERTENSION: Primary | ICD-10-CM

## 2023-02-24 PROCEDURE — 99207 PR NO CHARGE NURSE ONLY: CPT

## 2023-02-24 NOTE — PROGRESS NOTES
I met with Bria Moore at the request of Dr. Whalen to recheck her blood pressure.  Blood pressure medications on the med list were reviewed with patient.    Patient has taken all medications as per usual regimen: Yes  Patient reports tolerating them without any issues or concerns: Yes    Vitals:    02/24/23 0842 02/24/23 0849   BP: (!) 140/80 128/70   BP Location: Right arm Right arm   Patient Position: Sitting Sitting   Cuff Size: Adult Regular Adult Regular       After 5 minutes, the patient's blood pressure was <140/90, the previous encounter was reviewed, recorded blood pressure below 140/90. Patient was discharged and the note will be sent to the provider for final review.

## 2023-03-01 ENCOUNTER — TELEPHONE (OUTPATIENT)
Dept: FAMILY MEDICINE | Facility: CLINIC | Age: 58
End: 2023-03-01
Payer: COMMERCIAL

## 2023-03-07 ENCOUNTER — TELEPHONE (OUTPATIENT)
Dept: FAMILY MEDICINE | Facility: CLINIC | Age: 58
End: 2023-03-07
Payer: COMMERCIAL

## 2023-03-07 NOTE — TELEPHONE ENCOUNTER
Called patient and relayed message regarding blood pressure reading to patient and her daughter. No questions or concerns.    Dannielle JONES  [facility] Clinical Staff      Laurie Kuo RMA at 2/24/2023  8:45 AM    Status: Signed   Expand All Collapse All  I met with Bria Moore at the request of Dr. Whalen to recheck her blood pressure.  Blood pressure medications on the med list were reviewed with patient.    Patient has taken all medications as per usual regimen: Yes  Patient reports tolerating them without any issues or concerns: Yes     Vitals        Vitals:     02/24/23 0842 02/24/23 0849   BP: (!) 140/80 128/70   BP Location: Right arm Right arm   Patient Position: Sitting Sitting   Cuff Size: Adult Regular Adult Regular            After 5 minutes, the patient's blood pressure was <140/90, the previous encounter was reviewed, recorded blood pressure below 140/90. Patient was discharged and the note will be sent to the provider for final review.        Shannan Whalen DO at 2/24/2023  8:45 AM    Status: Signed   Call patient:      BP in normal range. No changes to meds advised

## 2023-04-03 NOTE — TELEPHONE ENCOUNTER
Medication Request  Medication name: pain medication   Requested Pharmacy: [facility]  Reason for request: hip pain from today's visit.   When did you use medication last?:    Patient offered appointment:  yes  Okay to leave a detailed message: yes       No

## 2023-04-19 ENCOUNTER — TELEPHONE (OUTPATIENT)
Dept: FAMILY MEDICINE | Facility: CLINIC | Age: 58
End: 2023-04-19
Payer: COMMERCIAL

## 2023-04-19 NOTE — TELEPHONE ENCOUNTER
Would get in touch with the patient and see if she is using it. If not, let pharmacy know to discontinue sending in refills

## 2023-04-19 NOTE — TELEPHONE ENCOUNTER
Not on current medication list    Last seen 1/30/23    Poor sleep/myalgias:  Was an issue last time.  She was trialed on gabapentin but she did not like it because it made her feel groggy and gave her a headache the next day.  Discussed trialing mirtazapine.  Patient is hesitant but ultimately amenable.  Reviewed side effect profile of the medication including increased depression, anxiety and possible suicidal ideation.  If persistent symptoms, she is to discontinue the medication.    Please advise on refill

## 2023-04-19 NOTE — TELEPHONE ENCOUNTER
Pharmacy calling to get a medication refill on medications attached    gabapentin (NEURONTIN) 300 MG capsule (Discontinued)

## 2023-04-20 ENCOUNTER — PATIENT OUTREACH (OUTPATIENT)
Dept: CARE COORDINATION | Facility: CLINIC | Age: 58
End: 2023-04-20
Payer: COMMERCIAL

## 2023-04-20 NOTE — TELEPHONE ENCOUNTER
LMTCB on mobile number. Please verify if taking Gabapentin, and also if home # is incorrect and if we can remove from contact.

## 2023-04-21 DIAGNOSIS — Z72.820 POOR SLEEP: ICD-10-CM

## 2023-04-21 RX ORDER — MIRTAZAPINE 7.5 MG/1
7.5 TABLET, FILM COATED ORAL AT BEDTIME
Qty: 90 TABLET | Refills: 2 | Status: SHIPPED | OUTPATIENT
Start: 2023-04-21

## 2023-04-21 NOTE — TELEPHONE ENCOUNTER
"Last Written Prescription Date:  1/30/23  Last Fill Quantity: 90,  # refills: 0   Last office visit provider:  1/30/23     Requested Prescriptions   Pending Prescriptions Disp Refills     mirtazapine (REMERON) 7.5 MG tablet 90 tablet 0     Sig: Take 1 tablet (7.5 mg) by mouth At Bedtime       Atypical Antidepressants Protocol Passed - 4/21/2023  4:28 PM        Passed - Recent (12 mo) or future (30 days) visit within the authorizing provider's specialty     Patient has had an office visit with the authorizing provider or a provider within the authorizing providers department within the previous 12 mos or has a future within next 30 days. See \"Patient Info\" tab in inbasket, or \"Choose Columns\" in Meds & Orders section of the refill encounter.              Passed - Medication active on med list        Passed - Patient is age 18 or older        Passed - No active pregnancy on record        Passed - No positive pregnancy test in past 12 mos             HILDA COLLAZO RN 04/21/23 4:28 PM  "

## 2023-05-19 NOTE — TELEPHONE ENCOUNTER
----- Message from Shannan Whalen DO sent at 2/28/2023  4:05 PM CST -----  Last colonoscopy 2018 with repeat recommended in 3 years. I have put in a referral for colonoscopy earlier this year but it looks like she hasn't schedule it. Please call and encourage her to set it up.     
Voicemail left for patient. Will also send patient a Mychart message that includes PCP's message below.   
Personal collateral

## 2023-06-03 ENCOUNTER — HEALTH MAINTENANCE LETTER (OUTPATIENT)
Age: 58
End: 2023-06-03

## 2023-10-14 NOTE — PROGRESS NOTES
Office Visit - Follow Up   Bria Moore   51 y.o. female    Date of Visit: 3/13/2017    Chief Complaint   Patient presents with     Abdominal Pain     Previously seen by Dr Mcdonnell. States for the last week she has had pain across her abdomen and down into her pelvis. States it is worse on the right than the left. Denies nausea, vomiting, diarrhea, fever, or dysuria.         Assessment and Plan   1. Right hip pain  Due to osteoarthritis. Pains sometimes go to the right groin. Does not experience low back pains. Works as a school . Lifts heavy trash bags. Aggravates her pains. Advise continue tylenol as needed.   - celecoxib (CELEBREX) 100 MG capsule; Take 1 capsule (100 mg total) by mouth daily.  Dispense: 30 capsule; Refill: 2    2. Osteoarthritis  Has osteoarthritis causing her right hip pains. Take celecoxib daily for a few days then stop if she has right hip pains.   - celecoxib (CELEBREX) 100 MG capsule; Take 1 capsule (100 mg total) by mouth daily.  Dispense: 30 capsule; Refill: 2    3. Discussed pathophysiology of osteoarthritis and its treatment.     Follow up as needed.      History of Present Illness   This 51 y.o. old female complains of right hip pains. Started several months ago, comes off and on aggravated by lifting heavy things. Pains radiate to her right groin. No leg weakness and numbness. No injury. Takes tylenol which helps.      Review of Systems   A 12 point comprehensive review of systems was negative except as noted..     Medications, Allergies and Problem List   Reviewed and updated             Chief Complaint   Abdominal Pain (Previously seen by Dr Mcdonnell. States for the last week she has had pain across her abdomen and down into her pelvis. States it is worse on the right than the left. Denies nausea, vomiting, diarrhea, fever, or dysuria. )       Patient Profile   Social History     Social History Narrative        Past Medical History   Patient Active Problem List   Diagnosis      Back pain     Inguinal pain, right       Past Surgical History  She has no past surgical history on file.       Medications and Allergies   Current Outpatient Prescriptions   Medication Sig     acetaminophen (TYLENOL) 325 MG tablet Take 650 mg by mouth 2 (two) times a day.     fexofenadine (ALLEGRA) 180 MG tablet Take 1 tablet (180 mg total) by mouth daily.     celecoxib (CELEBREX) 100 MG capsule Take 1 capsule (100 mg total) by mouth daily.     No Known Allergies     Family and Social History   No family history on file.     Social History   Substance Use Topics     Smoking status: Never Smoker     Smokeless tobacco: Never Used     Alcohol use None        Physical Exam       Physical Exam  Visit Vitals     /80     Pulse 80     Resp 18     Ht 5' (1.524 m)     Wt 121 lb (54.9 kg)     LMP 02/24/2017     Breastfeeding No     BMI 23.63 kg/m2     General appearance: alert, appears stated age, cooperative and no distress  Head: Normocephalic, without obvious abnormality, atraumatic  Throat: lips, mucosa, and tongue normal; teeth and gums normal  Neck: no adenopathy, no carotid bruit, no JVD, supple, symmetrical, trachea midline and thyroid not enlarged, symmetric, no tenderness/mass/nodules  Back: symmetric, no curvature. ROM normal. No CVA tenderness.  Lungs: clear to auscultation bilaterally  Heart: regular rate and rhythm, S1, S2 normal, no murmur, click, rub or gallop  Abdomen: soft, non-tender; bowel sounds normal; no masses,  no organomegaly  Extremities: extremities normal, atraumatic, no cyanosis or edema  Skin: Skin color, texture, turgor normal. No rashes or lesions  Neurologic: Grossly normal  Musculoskeletal: tender right hip but normal ROM     Additional Information        Jakub Andrea MD  Internal Medicine  Contact me at [phone]     Additional Information   Current Outpatient Prescriptions   Medication Sig     acetaminophen (TYLENOL) 325 MG tablet Take 650 mg by mouth 2 (two) times a  day.     fexofenadine (ALLEGRA) 180 MG tablet Take 1 tablet (180 mg total) by mouth daily.     celecoxib (CELEBREX) 100 MG capsule Take 1 capsule (100 mg total) by mouth daily.     No Known Allergies  Social History   Substance Use Topics     Smoking status: Never Smoker     Smokeless tobacco: Never Used     Alcohol use None         Time: total time spent with the patient was 25 minutes of which >50% was spent in counseling and coordination of care      Attending Only

## 2023-11-29 DIAGNOSIS — I10 ESSENTIAL HYPERTENSION: ICD-10-CM

## 2023-11-30 RX ORDER — SIMVASTATIN 10 MG
10 TABLET ORAL DAILY
Qty: 90 TABLET | Refills: 3 | Status: SHIPPED | OUTPATIENT
Start: 2023-11-30

## 2024-01-09 ENCOUNTER — ANCILLARY PROCEDURE (OUTPATIENT)
Dept: GENERAL RADIOLOGY | Facility: CLINIC | Age: 59
End: 2024-01-09
Attending: PHYSICIAN ASSISTANT
Payer: COMMERCIAL

## 2024-01-09 ENCOUNTER — OFFICE VISIT (OUTPATIENT)
Dept: FAMILY MEDICINE | Facility: CLINIC | Age: 59
End: 2024-01-09
Payer: COMMERCIAL

## 2024-01-09 VITALS
TEMPERATURE: 98 F | OXYGEN SATURATION: 99 % | SYSTOLIC BLOOD PRESSURE: 131 MMHG | HEIGHT: 60 IN | WEIGHT: 115 LBS | HEART RATE: 99 BPM | DIASTOLIC BLOOD PRESSURE: 74 MMHG | BODY MASS INDEX: 22.58 KG/M2 | RESPIRATION RATE: 16 BRPM

## 2024-01-09 DIAGNOSIS — R05.2 SUBACUTE COUGH: ICD-10-CM

## 2024-01-09 DIAGNOSIS — R05.2 SUBACUTE COUGH: Primary | ICD-10-CM

## 2024-01-09 DIAGNOSIS — I10 ESSENTIAL HYPERTENSION: ICD-10-CM

## 2024-01-09 PROCEDURE — 71046 X-RAY EXAM CHEST 2 VIEWS: CPT | Mod: TC | Performed by: RADIOLOGY

## 2024-01-09 PROCEDURE — 99214 OFFICE O/P EST MOD 30 MIN: CPT | Performed by: PHYSICIAN ASSISTANT

## 2024-01-09 RX ORDER — BENZONATATE 100 MG/1
100-200 CAPSULE ORAL 3 TIMES DAILY PRN
Qty: 40 CAPSULE | Refills: 0 | Status: SHIPPED | OUTPATIENT
Start: 2024-01-09

## 2024-01-09 ASSESSMENT — ENCOUNTER SYMPTOMS: COUGH: 1

## 2024-01-09 NOTE — PROGRESS NOTES
"  Bria is a 58 year old woman who presents to the clinic with a chief complaint of a cough for the past 3 weeks. She is accompanied by her daughter, who acted as her .      She reports the cough as mostly unproductive, and denies hematosis. Occasionally she coughs so hard that she has difficulty breathing for a few minutes. During this time her chest feels tight but she denies any pain. Cough frequency and severity stays about the same regardless of her position or time of day. She is a bit hoarse. She denies any difficulty swallowing that she was not feeling ill before the onset of the cough, and denies having any other symptoms such as fever, chills, or night sweats, and also has not had any significant weight changes. She has been taking Nyquil most nights, which helps her sleep but does not improve the cough. She has also tried tea with lemon and honey, which helps a little. No one around her has been ill. Overall she reports that she is active and does not \"feel\" sick, and is just concerned about the cough. She has not experienced this before, and thinks it might be connected to the cold, dry air.      Past medical history includes HTN, for which she has taken lisinopril (40 mg) and amlodipine (10 mg) for the past year. She also takes simvastatin (10 mg). She takes a daily multivitamin and vitamin D supplement. She does not smoke or vape. She has no known allergies. She denies any history of cardiac, kidney, or pulmonary concerns. No recent hospitalizations or surgeries.      "

## 2024-01-09 NOTE — COMMUNITY RESOURCES LIST (ENGLISH)
01/09/2024   [facility]  N/A  For questions about this resource list or additional care needs, please contact your primary care clinic or care manager.  Phone: [phone]   Email: N/A   Address: [address] [place] [id number]   Hours: N/A        Financial Stability       Rent and mortgage payment assistance  1  [facility] - Housing Stability - Rent and Mortgage Payment Assistance Distance: 0.78 miles      Phone/Virtual   [address] [place] [address]  Language: English, Hmong, Albanian  Hours: Mon - Fri Appt. Only  Fees: Free   Phone: (246) 368-5901 Website: https://[employer].org/     2  [facility] Finance Agency - Multifamily Rental Assistance Program Distance: 0.89 miles      Phone/Virtual   [address] [address]  Language: English  Hours: Mon - Fri 8:00 AM - 5:00 PM Appt. Only  Fees: Free   Phone: (738) 915-9937 Email: [place].[facility] Website: https://www.[facility].gov/index.html          Food and Nutrition       Food pantry  3  [facility] Food Market Distance: 0.78 miles      Pickup   [address] [place] [address]  Language: Mohawk, English, Hmong, Narcisa, Albanian  Hours: Mon 1:00 PM - 4:00 PM , Mon 5:00 PM - 6:30 PM , Tue - Fri 9:00 AM - 11:30 AM , Tue - Fri 1:00 PM - 3:30 PM  Fees: Free   Phone: (294) 640-2337 Website: https://[employer].org/     4  [facility] and Service [place] Distance: 0.96 miles      Pickup   [address] [place] [address]  Language: English, Hmong, Kinyarwanda, Albanian  Hours: Mon 11:00 AM - 3:00 PM , Wed 11:00 AM - 3:00 PM , Fri 11:00 AM - 3:00 PM  Fees: Free, Self Pay   Phone: (648) 700-3252 Email: Zohaib@[facility].salvation[employer].org Website: http://salvationarmynorth.org/community/qb-ctsu-q-[facility]/     SNAP application assistance  5  [facility] Outreach Distance: 0.78 miles       Phone/Virtual   [address] [place] [address]  Language: Estonian, English, Hmong, Narcisa, Marshallese  Hours: Mon - Fri 10:00 AM - 12:00 PM , Mon - Fri 2:00 PM - 4:00 PM  Fees: Free   Phone: (131) 102-3254 Website: https://[facility].org/     6  [facility] - [facility] Well[facility] Distance: 1.11 miles      In-Person, Phone/Virtual   [address] [place] [address]  Language: English  Hours: Mon - Fri 8:00 AM - 4:30 PM  Fees: Free   Phone: (866) 945-2216 Email: [email or web address]@[employer]. Website: https://www.[facility] City[facility]/your-government/departments/health-and-wellness     Soup kitchen or free meals  7  [facility] Office - [facility] and [facility] Distance: 0.35 miles      Pickup   [address] [place] [address]  Language: English  Hours: Mon - Fri 5:00 PM - 6:00 PM  Fees: Free   Phone: (848) 109-3798 Email: poncho@[facility] Website: http://www.[facility]/     8  [facility] Distance: 2.01 miles      Pickup   [address] [place] [address]  Language: English  Hours: Tue 2:00 PM - 4:00 PM , Thu 2:00 PM - 4:00 PM  Fees: Free   Phone: (383) 809-5083 Email: ashwin@[facility]. Website: https://www.[facility]/facilities/zojfgp-kftakvesv-dvcupm          Important Numbers & Websites       Emergency Services   911  City Services   311  Poison Control   (207) 338-4767  Suicide Prevention Lifeline   (242) 436-1286 (TALK)  Child Abuse Hotline   (673) 288-6000 (4-A-Child)  Sexual Assault Hotline   (378) 988-7263 (HOPE)  National Runaway Safeline   (452) 870-8013 (RUNAWAY)  All-Options Talkline   (555) 527-3776  Substance Abuse Referral   (909) 878-1281 (HELP)

## 2024-01-09 NOTE — PROGRESS NOTES
"  Assessment & Plan     Subacute cough  -xray looks reassuring  -trial of tessalon perles  -follow-up in 2 weeks if not improving, sooner with worsening.    - XR Chest 2 Views; Future  - benzonatate (TESSALON) 100 MG capsule; Take 1-2 capsules (100-200 mg) by mouth 3 times daily as needed for cough    Essential hypertension  -looks ok today    ANNABELLA [employer]        Subjective   Bria is a 58 year old, presenting for the following health issues:  Cough (Pt is here today for constant cough, pt stated that she has been coughing for 3 weeks.)      1/9/2024     2:45 PM   Additional Questions   Roomed by Ana M   Accompanied by Daughter        Bria is a 58 year old woman who presents to the clinic with a chief complaint of a cough for the past 3 weeks. She is accompanied by her daughter, who acted as her .       She reports the cough as mostly unproductive, and denies hematosis. Occasionally she coughs so hard that she has difficulty breathing for a few minutes. During this time her chest feels tight but she denies any pain. Cough frequency and severity stays about the same regardless of her position or time of day. She is a bit hoarse. She denies any difficulty swallowing that she was not feeling ill before the onset of the cough, and denies having any other symptoms such as fever, chills, or night sweats, and also has not had any significant weight changes. She has been taking Nyquil most nights, which helps her sleep but does not improve the cough. She has also tried tea with lemon and honey, which helps a little. No one around her has been ill. Overall she reports that she is active and does not \"feel\" sick, and is just concerned about the cough. She has not experienced this before, and thinks it might be connected to the cold, dry air.       Past medical history includes HTN, for which she has taken lisinopril (40 mg) and amlodipine (10 mg) for the past " "year. She also takes simvastatin (10 mg). She takes a daily multivitamin and vitamin D supplement. She does not smoke or vape. She has no known allergies. She denies any history of cardiac, kidney, or pulmonary concerns. No recent hospitalizations or surgeries.    History of Present Illness       Reason for visit:  Cough  Symptom onset:  3-4 weeks ago    She eats 0-1 servings of fruits and vegetables daily.She consumes 3 sweetened beverage(s) daily.She exercises with enough effort to increase her heart rate 9 or less minutes per day.  She exercises with enough effort to increase her heart rate 3 or less days per week. She is missing 1 dose(s) of medications per week.  She is not taking prescribed medications regularly due to remembering to take.     Review of Systems   Respiratory:  Positive for cough.           Objective    BP (!) 143/81 (BP Location: Left arm, Patient Position: Sitting, Cuff Size: Adult Regular)   Pulse 99   Temp 98  F (36.7  C) (Temporal)   Resp 16   Ht 1.515 m (4' 11.65\")   Wt 52.2 kg (115 lb)   LMP  (LMP Unknown)   SpO2 99%   BMI 22.73 kg/m    Body mass index is 22.73 kg/m .  Physical Exam   GENERAL: healthy, alert and no distress  NECK: no adenopathy, no asymmetry, masses, or scars and thyroid normal to palpation  RESP: lungs clear to auscultation - no rales, rhonchi or wheezes  CV: regular rate and rhythm, normal S1 S2, no S3 or S4, no murmur, click or rub, no peripheral edema and peripheral pulses strong  ABDOMEN: soft, nontender, no hepatosplenomegaly, no masses and bowel sounds normal  MS: no gross musculoskeletal defects noted, no edema    Narrative & Impression   EXAM: XR CHEST 2 VIEWS  LOCATION: [facility]  DATE: 1/9/2024     INDICATION:  Subacute cough  COMPARISON: Frontal and lateral views of the chest 04/22/2019                                                                      IMPRESSION:      Normal heart and mediastinal contours. Normal hilar " interfaces and lung vascularity.     There are several thin linear bands of cicatrization atelectasis in the left lung along the lateral costal pleura at the level of the left hilum. No new airspace or interstitial opacities. No focal lung volume loss. Diaphragmatic curvature is normal. No   pleural effusion.       Prior to immunization administration, verified patients identity using patient s name and date of birth. Please see Immunization Activity for additional information.     Screening Questionnaire for Adult Immunization    Are you sick today?   No   Do you have allergies to medications, food, a vaccine component or latex?   Yes   Have you ever had a serious reaction after receiving a vaccination?   No   Do you have a long-term health problem with heart, lung, kidney, or metabolic disease (e.g., diabetes), asthma, a blood disorder, no spleen, complement component deficiency, a cochlear implant, or a spinal fluid leak?  Are you on long-term aspirin therapy?   No   Do you have cancer, leukemia, HIV/AIDS, or any other immune system problem?   No   Do you have a parent, brother, or sister with an immune system problem?   No   In the past 3 months, have you taken medications that affect  your immune system, such as prednisone, other steroids, or anticancer drugs; drugs for the treatment of rheumatoid arthritis, Crohn s disease, or psoriasis; or have you had radiation treatments?   No   Have you had a seizure, or a brain or other nervous system problem?   No   During the past year, have you received a transfusion of blood or blood    products, or been given immune (gamma) globulin or antiviral drug?   No   For women: Are you pregnant or is there a chance you could become       pregnant during the next month?   No   Have you received any vaccinations in the past 4 weeks?   No     Immunization questionnaire was positive for at least one answer.  Notified .      Patient instructed to remain in clinic for 15 minutes  afterwards, and to report any adverse reactions.     Screening performed by Ana M Asher MA on 1/9/2024 at 2:51 PM.

## 2024-01-22 ENCOUNTER — TELEPHONE (OUTPATIENT)
Dept: FAMILY MEDICINE | Facility: CLINIC | Age: 59
End: 2024-01-22

## 2024-01-22 DIAGNOSIS — I10 ESSENTIAL HYPERTENSION: ICD-10-CM

## 2024-01-25 RX ORDER — AMLODIPINE BESYLATE 10 MG/1
10 TABLET ORAL DAILY
Qty: 90 TABLET | Refills: 0 | Status: SHIPPED | OUTPATIENT
Start: 2024-01-25 | End: 2024-05-16

## 2024-01-29 ENCOUNTER — TELEPHONE (OUTPATIENT)
Dept: FAMILY MEDICINE | Facility: CLINIC | Age: 59
End: 2024-01-29
Payer: COMMERCIAL

## 2024-01-29 DIAGNOSIS — I10 ESSENTIAL HYPERTENSION: ICD-10-CM

## 2024-01-31 RX ORDER — LISINOPRIL 20 MG/1
20 TABLET ORAL 2 TIMES DAILY
Qty: 120 TABLET | Refills: 0 | Status: SHIPPED | OUTPATIENT
Start: 2024-01-31

## 2024-01-31 NOTE — TELEPHONE ENCOUNTER
Fax received from pharmacy regarding rx sent in.    Alternative Requested for Lisinopril: Insurance requires a max of 1 tablet per day. Can you switch to the 40mg tabs? They are not scored tablets so splitting may be difficult.    Pended a 90 day supply of Lisinopril 40mg tabs, please review.      Dannielle Toussaint CMA (AAMA)  [facility]

## 2024-02-02 RX ORDER — LISINOPRIL 40 MG/1
40 TABLET ORAL DAILY
Qty: 90 TABLET | Refills: 0 | Status: SHIPPED | OUTPATIENT
Start: 2024-02-02

## 2024-05-16 DIAGNOSIS — I10 ESSENTIAL HYPERTENSION: ICD-10-CM

## 2024-05-16 RX ORDER — AMLODIPINE BESYLATE 10 MG/1
10 TABLET ORAL DAILY
Qty: 90 TABLET | Refills: 0 | Status: SHIPPED | OUTPATIENT
Start: 2024-05-16

## 2024-07-13 ENCOUNTER — HEALTH MAINTENANCE LETTER (OUTPATIENT)
Age: 59
End: 2024-07-13

## 2024-08-15 DIAGNOSIS — I10 ESSENTIAL HYPERTENSION: ICD-10-CM

## 2024-08-15 RX ORDER — AMLODIPINE BESYLATE 10 MG/1
10 TABLET ORAL DAILY
Qty: 90 TABLET | Refills: 0 | OUTPATIENT
Start: 2024-08-15

## 2024-09-18 DIAGNOSIS — I10 ESSENTIAL HYPERTENSION: ICD-10-CM

## 2024-09-18 RX ORDER — AMLODIPINE BESYLATE 10 MG/1
10 TABLET ORAL DAILY
Qty: 90 TABLET | Refills: 0 | OUTPATIENT
Start: 2024-09-18

## 2024-09-18 NOTE — LETTER
September 18, 2024      Bria Moore  [address] [place] [address]        Dear Bria,     We have attempted to reach you via phone and have received no response. It's been more than 1 year since you were last seen in out office. Please call our office at [phone] to schedule appointment for ongoing refills on your medications.       Sincerely,        Felicia Valencia NP

## 2024-09-18 NOTE — TELEPHONE ENCOUNTER
Call to let patient know:     Been more than 1 year since last seen.     Needs to be seen for ongoing refills.

## 2024-09-18 NOTE — TELEPHONE ENCOUNTER
Called and left message for patient to call back. Please rely provider message below and assist as needed     Letter sent via mail.

## 2024-09-23 NOTE — TELEPHONE ENCOUNTER
Left message for patient to call back. Please relay message from Dr. Whalen and assist as needed.

## 2025-01-02 ENCOUNTER — VIRTUAL VISIT (OUTPATIENT)
Dept: FAMILY MEDICINE | Facility: CLINIC | Age: 60
End: 2025-01-02
Payer: COMMERCIAL

## 2025-01-02 DIAGNOSIS — I10 ESSENTIAL HYPERTENSION: Primary | ICD-10-CM

## 2025-01-02 DIAGNOSIS — E78.2 MIXED HYPERLIPIDEMIA: ICD-10-CM

## 2025-01-02 RX ORDER — LOSARTAN POTASSIUM 50 MG/1
50 TABLET ORAL DAILY
Qty: 90 TABLET | Refills: 0 | Status: SHIPPED | OUTPATIENT
Start: 2025-01-02

## 2025-01-02 RX ORDER — AMLODIPINE BESYLATE 10 MG/1
10 TABLET ORAL DAILY
Qty: 90 TABLET | Refills: 0 | Status: SHIPPED | OUTPATIENT
Start: 2025-01-02

## 2025-01-02 RX ORDER — SIMVASTATIN 10 MG
10 TABLET ORAL DAILY
Qty: 90 TABLET | Refills: 3 | Status: SHIPPED | OUTPATIENT
Start: 2025-01-02

## 2025-01-02 NOTE — PROGRESS NOTES
"Bria is a 59 year old who is being evaluated via a billable video visit.    How would you like to obtain your AVS? MyChart  If the video visit is dropped, the invitation should be resent by: Text to cell phone: [phone]  Will anyone else be joining your video visit? No      Assessment & Plan     Essential hypertension  -off meds for some time, BP elevated at dentist  -will restart amlodipine and switch from lisinopril to losartan 50mg  -plan to follow-up for lab only visit and BP check within the next 1-2 weeks  -recommended CPE in March or April as well  - amLODIPine (NORVASC) 10 MG tablet; Take 1 tablet (10 mg) by mouth daily.  - simvastatin (ZOCOR) 10 MG tablet; Take 1 tablet (10 mg) by mouth daily.  - Basic metabolic panel  (Ca, Cl, CO2, Creat, Gluc, K, Na, BUN); Future  - Lipid Profile (Chol, Trig, HDL, LDL calc); Future  - losartan (COZAAR) 50 MG tablet; Take 1 tablet (50 mg) by mouth daily.    Mixed hyperlipidemia  -on statin, will recheck lipids.        23 minutes spent by me on the date of the encounter doing chart review, patient visit, documentation, and discussion with family     Subjective   Bria is a 59 year old, presenting for the following health issues:  Refill Request      1/2/2025     9:35 AM   Additional Questions   Roomed by Paige OLGUIN     -needs meds refilled.  On simvastatin and amlodipine.  Was on lisinopril 20mg but was having a cough  -has been out of both medications for some time  -was to have dental surgery and was postponed    BP Readings from Last 3 Encounters:  01/09/24 : 131/74  02/24/23 : 128/70  01/30/23 : (!) 160/84    -has been having lower leg swelling after standing for a long time at work  -does not occur every day but \"sometimes\"  -no redness, no pain  -swelling improves when she puts her legs up for 20 minutes after work    History of Present Illness       Reason for visit:  Med Refills. She is missing 7 dose(s) of medications per week.  She is not taking prescribed " "medications regularly due to other.           Objective    Vitals - Patient Reported  Weight (Patient Reported): 54.4 kg (120 lb)  Height (Patient Reported): 154.9 cm (5' 1\")  BMI (Based on Pt Reported Ht/Wt): 22.67        Physical Exam   GENERAL: alert and no distress  EYES: Eyes grossly normal to inspection.  No discharge or erythema, or obvious scleral/conjunctival abnormalities.  RESP: No audible wheeze, cough, or visible cyanosis.    SKIN: Visible skin clear. No significant rash, abnormal pigmentation or lesions.  NEURO: Cranial nerves grossly intact.  Mentation and speech appropriate for age.  PSYCH: Appropriate affect, tone, and pace of words        Video-Visit Details    Type of service:  Video Visit   Originating Location (pt. Location): Home    Distant Location (provider location):  Off-site  Platform used for Video Visit: Krystina  Signed Electronically by: Laurie Guevara PA-C    "

## 2025-03-30 DIAGNOSIS — I10 ESSENTIAL HYPERTENSION: ICD-10-CM

## 2025-03-30 RX ORDER — AMLODIPINE BESYLATE 10 MG/1
10 TABLET ORAL DAILY
Qty: 90 TABLET | Refills: 0 | Status: SHIPPED | OUTPATIENT
Start: 2025-03-30

## 2025-03-30 RX ORDER — LOSARTAN POTASSIUM 50 MG/1
50 TABLET ORAL DAILY
Qty: 90 TABLET | Refills: 0 | Status: SHIPPED | OUTPATIENT
Start: 2025-03-30

## 2025-07-19 ENCOUNTER — HEALTH MAINTENANCE LETTER (OUTPATIENT)
Age: 60
End: 2025-07-19